# Patient Record
Sex: MALE | Race: WHITE | Employment: FULL TIME | URBAN - METROPOLITAN AREA
[De-identification: names, ages, dates, MRNs, and addresses within clinical notes are randomized per-mention and may not be internally consistent; named-entity substitution may affect disease eponyms.]

---

## 2018-04-09 PROBLEM — Z95.4 HISTORY OF AORTIC VALVE REPLACEMENT WITH METALLIC VALVE: Status: ACTIVE | Noted: 2018-04-09

## 2018-04-11 ENCOUNTER — TELEPHONE (OUTPATIENT)
Dept: PHARMACY | Age: 68
End: 2018-04-11

## 2018-04-11 RX ORDER — METOPROLOL SUCCINATE 50 MG/1
50 TABLET, EXTENDED RELEASE ORAL DAILY
COMMUNITY

## 2018-04-11 RX ORDER — DOCUSATE SODIUM 100 MG/1
100 CAPSULE, LIQUID FILLED ORAL 2 TIMES DAILY PRN
COMMUNITY
End: 2019-05-30

## 2018-04-11 RX ORDER — UBIDECARENONE 100 MG
200 CAPSULE ORAL DAILY
COMMUNITY

## 2018-04-11 RX ORDER — M-VIT,TX,IRON,MINS/CALC/FOLIC 27MG-0.4MG
2 TABLET ORAL DAILY
COMMUNITY

## 2018-04-11 RX ORDER — WARFARIN SODIUM 5 MG/1
5 TABLET ORAL
COMMUNITY
End: 2018-04-16 | Stop reason: SDUPTHER

## 2018-04-11 RX ORDER — FUROSEMIDE 20 MG/1
20 TABLET ORAL DAILY PRN
COMMUNITY
End: 2019-05-30

## 2018-04-11 RX ORDER — ACETAMINOPHEN 325 MG/1
325 TABLET ORAL 2 TIMES DAILY
COMMUNITY
End: 2018-06-28 | Stop reason: ALTCHOICE

## 2018-04-16 RX ORDER — WARFARIN SODIUM 5 MG/1
TABLET ORAL
Qty: 42 TABLET | Refills: 3 | Status: SHIPPED | OUTPATIENT
Start: 2018-04-16 | End: 2018-05-11 | Stop reason: ALTCHOICE

## 2018-05-11 ENCOUNTER — HOSPITAL ENCOUNTER (OUTPATIENT)
Dept: PHARMACY | Age: 68
Setting detail: THERAPIES SERIES
Discharge: HOME | End: 2018-05-11
Payer: COMMERCIAL

## 2018-05-11 VITALS
HEART RATE: 58 BPM | BODY MASS INDEX: 29.75 KG/M2 | WEIGHT: 238 LBS | SYSTOLIC BLOOD PRESSURE: 137 MMHG | DIASTOLIC BLOOD PRESSURE: 72 MMHG

## 2018-05-11 DIAGNOSIS — Z95.4 HISTORY OF AORTIC VALVE REPLACEMENT WITH METALLIC VALVE: ICD-10-CM

## 2018-05-11 LAB — INTERNATIONAL NORMALIZATION RATIO, POC: 3

## 2018-05-11 PROCEDURE — 99212 OFFICE O/P EST SF 10 MIN: CPT

## 2018-05-11 PROCEDURE — 85610 PROTHROMBIN TIME: CPT

## 2018-05-11 RX ORDER — WARFARIN SODIUM 10 MG/1
TABLET ORAL
Qty: 90 TABLET | Refills: 3 | Status: SHIPPED | OUTPATIENT
Start: 2018-05-11 | End: 2018-06-28 | Stop reason: SDUPTHER

## 2018-05-11 RX ORDER — CEPHALEXIN 500 MG/1
500 CAPSULE ORAL 2 TIMES DAILY
COMMUNITY
End: 2018-11-15

## 2018-05-21 ENCOUNTER — HOSPITAL ENCOUNTER (OUTPATIENT)
Dept: PHARMACY | Age: 68
Setting detail: THERAPIES SERIES
Discharge: HOME | End: 2018-05-21
Payer: COMMERCIAL

## 2018-05-21 VITALS
HEART RATE: 81 BPM | DIASTOLIC BLOOD PRESSURE: 76 MMHG | BODY MASS INDEX: 30.5 KG/M2 | WEIGHT: 244 LBS | SYSTOLIC BLOOD PRESSURE: 144 MMHG

## 2018-05-21 DIAGNOSIS — Z95.4 HISTORY OF AORTIC VALVE REPLACEMENT WITH METALLIC VALVE: ICD-10-CM

## 2018-05-21 LAB — INTERNATIONAL NORMALIZATION RATIO, POC: 4.3

## 2018-05-21 PROCEDURE — 99211 OFF/OP EST MAY X REQ PHY/QHP: CPT

## 2018-05-21 PROCEDURE — 85610 PROTHROMBIN TIME: CPT

## 2018-05-31 ENCOUNTER — HOSPITAL ENCOUNTER (OUTPATIENT)
Dept: PHARMACY | Age: 68
Setting detail: THERAPIES SERIES
Discharge: HOME | End: 2018-05-31
Payer: COMMERCIAL

## 2018-05-31 VITALS
DIASTOLIC BLOOD PRESSURE: 71 MMHG | BODY MASS INDEX: 29.67 KG/M2 | WEIGHT: 237.4 LBS | SYSTOLIC BLOOD PRESSURE: 123 MMHG | HEART RATE: 71 BPM

## 2018-05-31 DIAGNOSIS — Z95.4 HISTORY OF AORTIC VALVE REPLACEMENT WITH METALLIC VALVE: ICD-10-CM

## 2018-05-31 LAB — INTERNATIONAL NORMALIZATION RATIO, POC: 1.9

## 2018-05-31 PROCEDURE — 85610 PROTHROMBIN TIME: CPT

## 2018-06-14 ENCOUNTER — HOSPITAL ENCOUNTER (OUTPATIENT)
Dept: PHARMACY | Age: 68
Setting detail: THERAPIES SERIES
Discharge: HOME | End: 2018-06-14
Payer: COMMERCIAL

## 2018-06-14 VITALS
DIASTOLIC BLOOD PRESSURE: 83 MMHG | WEIGHT: 244.4 LBS | BODY MASS INDEX: 30.55 KG/M2 | HEART RATE: 75 BPM | SYSTOLIC BLOOD PRESSURE: 165 MMHG

## 2018-06-14 DIAGNOSIS — Z95.4 HISTORY OF AORTIC VALVE REPLACEMENT WITH METALLIC VALVE: ICD-10-CM

## 2018-06-14 LAB — INTERNATIONAL NORMALIZATION RATIO, POC: 2.2

## 2018-06-14 PROCEDURE — 85610 PROTHROMBIN TIME: CPT

## 2018-06-14 PROCEDURE — 99211 OFF/OP EST MAY X REQ PHY/QHP: CPT

## 2018-06-28 ENCOUNTER — HOSPITAL ENCOUNTER (OUTPATIENT)
Dept: PHARMACY | Age: 68
Setting detail: THERAPIES SERIES
Discharge: HOME | End: 2018-06-28
Payer: COMMERCIAL

## 2018-06-28 VITALS
DIASTOLIC BLOOD PRESSURE: 79 MMHG | HEART RATE: 64 BPM | SYSTOLIC BLOOD PRESSURE: 168 MMHG | BODY MASS INDEX: 30.77 KG/M2 | WEIGHT: 246.2 LBS

## 2018-06-28 DIAGNOSIS — Z95.4 HISTORY OF AORTIC VALVE REPLACEMENT WITH METALLIC VALVE: ICD-10-CM

## 2018-06-28 LAB — INTERNATIONAL NORMALIZATION RATIO, POC: 3.4

## 2018-06-28 PROCEDURE — 99211 OFF/OP EST MAY X REQ PHY/QHP: CPT

## 2018-06-28 PROCEDURE — 85610 PROTHROMBIN TIME: CPT

## 2018-06-28 RX ORDER — WARFARIN SODIUM 10 MG/1
TABLET ORAL
Qty: 100 TABLET | Refills: 3 | Status: SHIPPED | OUTPATIENT
Start: 2018-06-28 | End: 2019-08-12 | Stop reason: SDUPTHER

## 2018-07-12 ENCOUNTER — HOSPITAL ENCOUNTER (OUTPATIENT)
Dept: PHARMACY | Age: 68
Setting detail: THERAPIES SERIES
Discharge: HOME | End: 2018-07-12
Payer: COMMERCIAL

## 2018-07-12 VITALS
WEIGHT: 242.2 LBS | BODY MASS INDEX: 30.27 KG/M2 | SYSTOLIC BLOOD PRESSURE: 154 MMHG | HEART RATE: 60 BPM | DIASTOLIC BLOOD PRESSURE: 76 MMHG

## 2018-07-12 DIAGNOSIS — Z95.2 HISTORY OF MECHANICAL AORTIC VALVE REPLACEMENT: ICD-10-CM

## 2018-07-12 DIAGNOSIS — Z95.4 HISTORY OF AORTIC VALVE REPLACEMENT WITH METALLIC VALVE: ICD-10-CM

## 2018-07-12 LAB — INTERNATIONAL NORMALIZATION RATIO, POC: 2.2

## 2018-07-12 PROCEDURE — 85610 PROTHROMBIN TIME: CPT

## 2018-07-12 PROCEDURE — 99211 OFF/OP EST MAY X REQ PHY/QHP: CPT

## 2018-07-12 RX ORDER — ACETAMINOPHEN 500 MG
500 TABLET ORAL DAILY PRN
COMMUNITY
End: 2019-05-30

## 2018-07-12 RX ORDER — SENNOSIDES 8.6 MG
650 CAPSULE ORAL DAILY PRN
COMMUNITY

## 2018-07-26 ENCOUNTER — HOSPITAL ENCOUNTER (OUTPATIENT)
Dept: PHARMACY | Age: 68
Setting detail: THERAPIES SERIES
Discharge: HOME | End: 2018-07-26
Payer: COMMERCIAL

## 2018-07-26 VITALS
WEIGHT: 244.6 LBS | BODY MASS INDEX: 30.57 KG/M2 | HEART RATE: 64 BPM | SYSTOLIC BLOOD PRESSURE: 147 MMHG | DIASTOLIC BLOOD PRESSURE: 75 MMHG

## 2018-07-26 DIAGNOSIS — Z95.2 HISTORY OF MECHANICAL AORTIC VALVE REPLACEMENT: ICD-10-CM

## 2018-07-26 LAB — INTERNATIONAL NORMALIZATION RATIO, POC: 5.1

## 2018-07-26 PROCEDURE — 85610 PROTHROMBIN TIME: CPT

## 2018-07-26 PROCEDURE — 99211 OFF/OP EST MAY X REQ PHY/QHP: CPT

## 2018-08-02 ENCOUNTER — HOSPITAL ENCOUNTER (OUTPATIENT)
Dept: PHARMACY | Age: 68
Setting detail: THERAPIES SERIES
Discharge: HOME | End: 2018-08-02
Payer: COMMERCIAL

## 2018-08-02 VITALS
SYSTOLIC BLOOD PRESSURE: 175 MMHG | DIASTOLIC BLOOD PRESSURE: 93 MMHG | BODY MASS INDEX: 30.5 KG/M2 | HEART RATE: 78 BPM | WEIGHT: 244 LBS

## 2018-08-02 DIAGNOSIS — Z95.2 HISTORY OF MECHANICAL AORTIC VALVE REPLACEMENT: ICD-10-CM

## 2018-08-02 LAB — INTERNATIONAL NORMALIZATION RATIO, POC: 2.7

## 2018-08-02 PROCEDURE — 85610 PROTHROMBIN TIME: CPT

## 2018-08-02 PROCEDURE — 99211 OFF/OP EST MAY X REQ PHY/QHP: CPT

## 2018-08-02 NOTE — PROGRESS NOTES
[id number] [facility]    Patient Findings     Negatives:   Signs/symptoms of thrombosis, Signs/symptoms of bleeding, Laboratory test error suspected, Change in health, Change in alcohol use, Change in activity, Upcoming invasive procedure, Emergency department visit, Upcoming dental procedure, Missed doses, Extra doses, Change in medications, Change in diet/appetite, Hospital admission, Bruising, Other complaints         Patient  reports that he has never smoked. He has never used smokeless tobacco.     Assessment/Plan:  INR is therapeutic at 2.7, goal is 2.5-3.5  Warfarin Dose: continue same, 10 mg daily except 15 mg on Thursdays  Follow Up: 2 weeks    Patient understands dosing directions and information discussed. Dosing schedule and follow up appointment given to patient. Patient acknowledges working in consult agreement with pharmacist as referred by his/her physician.     Geri Horn PharmD 8/2/2018 4:01 PM

## 2018-08-16 ENCOUNTER — HOSPITAL ENCOUNTER (OUTPATIENT)
Dept: PHARMACY | Age: 68
Setting detail: THERAPIES SERIES
Discharge: HOME | End: 2018-08-16
Payer: COMMERCIAL

## 2018-08-16 VITALS
DIASTOLIC BLOOD PRESSURE: 80 MMHG | BODY MASS INDEX: 30.6 KG/M2 | SYSTOLIC BLOOD PRESSURE: 156 MMHG | HEART RATE: 56 BPM | WEIGHT: 244.8 LBS

## 2018-08-16 DIAGNOSIS — Z95.2 HISTORY OF MECHANICAL AORTIC VALVE REPLACEMENT: ICD-10-CM

## 2018-08-16 LAB — INTERNATIONAL NORMALIZATION RATIO, POC: 2.9

## 2018-08-16 PROCEDURE — 85610 PROTHROMBIN TIME: CPT

## 2018-08-16 PROCEDURE — 99211 OFF/OP EST MAY X REQ PHY/QHP: CPT

## 2018-09-06 ENCOUNTER — HOSPITAL ENCOUNTER (OUTPATIENT)
Dept: PHARMACY | Age: 68
Setting detail: THERAPIES SERIES
Discharge: HOME | End: 2018-09-06
Payer: COMMERCIAL

## 2018-09-06 VITALS
BODY MASS INDEX: 30.95 KG/M2 | DIASTOLIC BLOOD PRESSURE: 78 MMHG | SYSTOLIC BLOOD PRESSURE: 158 MMHG | WEIGHT: 247.6 LBS | HEART RATE: 65 BPM

## 2018-09-06 DIAGNOSIS — Z95.2 HISTORY OF MECHANICAL AORTIC VALVE REPLACEMENT: ICD-10-CM

## 2018-09-06 LAB — INTERNATIONAL NORMALIZATION RATIO, POC: 2.7

## 2018-09-06 PROCEDURE — 99211 OFF/OP EST MAY X REQ PHY/QHP: CPT

## 2018-09-06 PROCEDURE — 85610 PROTHROMBIN TIME: CPT

## 2018-10-04 ENCOUNTER — HOSPITAL ENCOUNTER (OUTPATIENT)
Dept: PHARMACY | Age: 68
Setting detail: THERAPIES SERIES
Discharge: HOME | End: 2018-10-04
Payer: COMMERCIAL

## 2018-10-04 VITALS
BODY MASS INDEX: 31.3 KG/M2 | HEART RATE: 70 BPM | DIASTOLIC BLOOD PRESSURE: 88 MMHG | SYSTOLIC BLOOD PRESSURE: 159 MMHG | WEIGHT: 250.4 LBS

## 2018-10-04 DIAGNOSIS — Z95.2 HISTORY OF MECHANICAL AORTIC VALVE REPLACEMENT: ICD-10-CM

## 2018-10-04 LAB — INTERNATIONAL NORMALIZATION RATIO, POC: 3.9

## 2018-10-04 PROCEDURE — 99211 OFF/OP EST MAY X REQ PHY/QHP: CPT

## 2018-10-04 PROCEDURE — 85610 PROTHROMBIN TIME: CPT

## 2018-10-25 ENCOUNTER — HOSPITAL ENCOUNTER (OUTPATIENT)
Dept: PHARMACY | Age: 68
Setting detail: THERAPIES SERIES
Discharge: HOME | End: 2018-10-25
Payer: COMMERCIAL

## 2018-10-25 VITALS
SYSTOLIC BLOOD PRESSURE: 145 MMHG | HEART RATE: 77 BPM | DIASTOLIC BLOOD PRESSURE: 81 MMHG | BODY MASS INDEX: 31.6 KG/M2 | WEIGHT: 252.8 LBS

## 2018-10-25 DIAGNOSIS — Z95.2 HISTORY OF MECHANICAL AORTIC VALVE REPLACEMENT: ICD-10-CM

## 2018-10-25 LAB — INTERNATIONAL NORMALIZATION RATIO, POC: 4.7

## 2018-10-25 PROCEDURE — 85610 PROTHROMBIN TIME: CPT

## 2018-10-25 PROCEDURE — 99211 OFF/OP EST MAY X REQ PHY/QHP: CPT

## 2018-11-15 ENCOUNTER — HOSPITAL ENCOUNTER (OUTPATIENT)
Dept: PHARMACY | Age: 68
Setting detail: THERAPIES SERIES
Discharge: HOME | End: 2018-11-15
Payer: COMMERCIAL

## 2018-11-15 VITALS
WEIGHT: 253.6 LBS | DIASTOLIC BLOOD PRESSURE: 90 MMHG | SYSTOLIC BLOOD PRESSURE: 172 MMHG | BODY MASS INDEX: 31.7 KG/M2 | HEART RATE: 71 BPM

## 2018-11-15 DIAGNOSIS — Z95.2 HISTORY OF MECHANICAL AORTIC VALVE REPLACEMENT: ICD-10-CM

## 2018-11-15 LAB — INTERNATIONAL NORMALIZATION RATIO, POC: 3.4

## 2018-11-15 PROCEDURE — 99211 OFF/OP EST MAY X REQ PHY/QHP: CPT

## 2018-11-15 PROCEDURE — 85610 PROTHROMBIN TIME: CPT

## 2018-11-15 RX ORDER — LOSARTAN POTASSIUM 50 MG/1
75 TABLET ORAL DAILY
COMMUNITY

## 2018-12-13 ENCOUNTER — HOSPITAL ENCOUNTER (OUTPATIENT)
Dept: PHARMACY | Age: 68
Setting detail: THERAPIES SERIES
Discharge: HOME | End: 2018-12-13
Payer: COMMERCIAL

## 2018-12-13 VITALS
SYSTOLIC BLOOD PRESSURE: 159 MMHG | HEART RATE: 76 BPM | BODY MASS INDEX: 32.52 KG/M2 | WEIGHT: 260.2 LBS | DIASTOLIC BLOOD PRESSURE: 78 MMHG

## 2018-12-13 DIAGNOSIS — Z95.2 HISTORY OF MECHANICAL AORTIC VALVE REPLACEMENT: ICD-10-CM

## 2018-12-13 LAB — INTERNATIONAL NORMALIZATION RATIO, POC: 3.6

## 2018-12-13 PROCEDURE — 99211 OFF/OP EST MAY X REQ PHY/QHP: CPT

## 2018-12-13 PROCEDURE — 85610 PROTHROMBIN TIME: CPT

## 2019-01-10 ENCOUNTER — HOSPITAL ENCOUNTER (OUTPATIENT)
Dept: PHARMACY | Age: 69
Setting detail: THERAPIES SERIES
Discharge: HOME | End: 2019-01-10
Payer: COMMERCIAL

## 2019-01-10 VITALS
WEIGHT: 256.4 LBS | BODY MASS INDEX: 32.05 KG/M2 | SYSTOLIC BLOOD PRESSURE: 161 MMHG | DIASTOLIC BLOOD PRESSURE: 84 MMHG | HEART RATE: 71 BPM

## 2019-01-10 DIAGNOSIS — Z95.2 HISTORY OF MECHANICAL AORTIC VALVE REPLACEMENT: ICD-10-CM

## 2019-01-10 LAB — INTERNATIONAL NORMALIZATION RATIO, POC: 2.3

## 2019-01-10 PROCEDURE — 85610 PROTHROMBIN TIME: CPT

## 2019-01-10 PROCEDURE — 99211 OFF/OP EST MAY X REQ PHY/QHP: CPT

## 2019-02-07 ENCOUNTER — HOSPITAL ENCOUNTER (OUTPATIENT)
Dept: PHARMACY | Age: 69
Setting detail: THERAPIES SERIES
Discharge: HOME | End: 2019-02-07
Payer: COMMERCIAL

## 2019-02-07 VITALS
WEIGHT: 251 LBS | DIASTOLIC BLOOD PRESSURE: 79 MMHG | BODY MASS INDEX: 31.37 KG/M2 | SYSTOLIC BLOOD PRESSURE: 135 MMHG | HEART RATE: 72 BPM

## 2019-02-07 DIAGNOSIS — Z95.2 HISTORY OF MECHANICAL AORTIC VALVE REPLACEMENT: ICD-10-CM

## 2019-02-07 LAB — INTERNATIONAL NORMALIZATION RATIO, POC: 2.6

## 2019-02-07 PROCEDURE — 99211 OFF/OP EST MAY X REQ PHY/QHP: CPT

## 2019-02-07 PROCEDURE — 85610 PROTHROMBIN TIME: CPT

## 2019-03-07 ENCOUNTER — HOSPITAL ENCOUNTER (OUTPATIENT)
Dept: PHARMACY | Age: 69
Setting detail: THERAPIES SERIES
Discharge: HOME | End: 2019-03-07
Payer: COMMERCIAL

## 2019-03-07 VITALS
BODY MASS INDEX: 31.85 KG/M2 | WEIGHT: 254.8 LBS | DIASTOLIC BLOOD PRESSURE: 75 MMHG | HEART RATE: 65 BPM | SYSTOLIC BLOOD PRESSURE: 129 MMHG

## 2019-03-07 DIAGNOSIS — Z95.2 HISTORY OF MECHANICAL AORTIC VALVE REPLACEMENT: ICD-10-CM

## 2019-03-07 LAB — INR BLD: 1.7

## 2019-03-07 PROCEDURE — 99211 OFF/OP EST MAY X REQ PHY/QHP: CPT

## 2019-03-07 PROCEDURE — 85610 PROTHROMBIN TIME: CPT

## 2019-03-07 PROCEDURE — G0463 HOSPITAL OUTPT CLINIC VISIT: HCPCS

## 2019-03-21 ENCOUNTER — HOSPITAL ENCOUNTER (OUTPATIENT)
Dept: PHARMACY | Age: 69
Setting detail: THERAPIES SERIES
Discharge: HOME | End: 2019-03-21
Payer: COMMERCIAL

## 2019-03-21 VITALS
SYSTOLIC BLOOD PRESSURE: 159 MMHG | BODY MASS INDEX: 32.05 KG/M2 | WEIGHT: 256.4 LBS | DIASTOLIC BLOOD PRESSURE: 84 MMHG | HEART RATE: 64 BPM

## 2019-03-21 DIAGNOSIS — Z95.2 HISTORY OF MECHANICAL AORTIC VALVE REPLACEMENT: ICD-10-CM

## 2019-03-21 LAB
INR BLD: 7
INR BLD: 7.1

## 2019-03-21 PROCEDURE — G0463 HOSPITAL OUTPT CLINIC VISIT: HCPCS

## 2019-03-21 PROCEDURE — 85610 PROTHROMBIN TIME: CPT

## 2019-03-21 PROCEDURE — 99211 OFF/OP EST MAY X REQ PHY/QHP: CPT

## 2019-03-28 ENCOUNTER — HOSPITAL ENCOUNTER (OUTPATIENT)
Dept: PHARMACY | Age: 69
Setting detail: THERAPIES SERIES
Discharge: HOME | End: 2019-03-28
Payer: COMMERCIAL

## 2019-03-28 VITALS
HEART RATE: 71 BPM | WEIGHT: 255.2 LBS | SYSTOLIC BLOOD PRESSURE: 132 MMHG | BODY MASS INDEX: 31.9 KG/M2 | DIASTOLIC BLOOD PRESSURE: 81 MMHG

## 2019-03-28 DIAGNOSIS — Z95.2 HISTORY OF MECHANICAL AORTIC VALVE REPLACEMENT: ICD-10-CM

## 2019-03-28 LAB — INR BLD: 1.8

## 2019-03-28 PROCEDURE — 85610 PROTHROMBIN TIME: CPT

## 2019-04-11 ENCOUNTER — HOSPITAL ENCOUNTER (OUTPATIENT)
Dept: PHARMACY | Age: 69
Setting detail: THERAPIES SERIES
Discharge: HOME | End: 2019-04-11
Payer: COMMERCIAL

## 2019-04-11 VITALS
SYSTOLIC BLOOD PRESSURE: 150 MMHG | DIASTOLIC BLOOD PRESSURE: 78 MMHG | WEIGHT: 257.4 LBS | BODY MASS INDEX: 32.17 KG/M2 | HEART RATE: 67 BPM

## 2019-04-11 DIAGNOSIS — Z95.2 HISTORY OF MECHANICAL AORTIC VALVE REPLACEMENT: ICD-10-CM

## 2019-04-11 LAB — INTERNATIONAL NORMALIZATION RATIO, POC: 4.3

## 2019-04-11 PROCEDURE — 85610 PROTHROMBIN TIME: CPT

## 2019-04-11 PROCEDURE — 99211 OFF/OP EST MAY X REQ PHY/QHP: CPT

## 2019-04-11 NOTE — PROGRESS NOTES
[address] [facility]    Patient Findings     Negatives:   Signs/symptoms of thrombosis, Signs/symptoms of bleeding, Laboratory test error suspected, Change in health, Change in alcohol use, Change in activity, Upcoming invasive procedure, Emergency department visit, Upcoming dental procedure, Missed doses, Extra doses, Change in medications, Change in diet/appetite, Hospital admission, Bruising, Other complaints         Patient  reports that he has never smoked. He has never used smokeless tobacco.     Assessment/Plan:  Warfarin indication: mechanical aortic valve replacement      INR today is SUPRAtherapeutic at 4.3, goal is 2.5-3.5. NO KNOWN CAUSE. PATIENT HAS BEEN FLUCTUATING BETWEEN SUB & SUPRATHERAPEUTIC SO WILL NOT CHANGE DOSE AT THIS TIME FELIPE W/ VALVE REPLACEMENT    Warfarin Dose: 5MG TODAY; THEN CONTINUE SAME (10MG DAILY)    Follow Up: 2 weeks    Patient understands dosing directions and information discussed. Dosing schedule and follow up appointment given to patient. Patient acknowledges working in consult agreement with pharmacist as referred by his/her physician.     Fadumo Anaya PharmD 4/11/2019 4:11 PM

## 2019-04-25 ENCOUNTER — HOSPITAL ENCOUNTER (OUTPATIENT)
Dept: PHARMACY | Age: 69
Setting detail: THERAPIES SERIES
Discharge: HOME | End: 2019-04-25
Payer: COMMERCIAL

## 2019-04-25 VITALS
WEIGHT: 255.6 LBS | DIASTOLIC BLOOD PRESSURE: 83 MMHG | HEART RATE: 65 BPM | BODY MASS INDEX: 31.95 KG/M2 | SYSTOLIC BLOOD PRESSURE: 159 MMHG

## 2019-04-25 DIAGNOSIS — Z95.2 HISTORY OF MECHANICAL AORTIC VALVE REPLACEMENT: ICD-10-CM

## 2019-04-25 LAB — INTERNATIONAL NORMALIZATION RATIO, POC: 5.8

## 2019-04-25 PROCEDURE — 85610 PROTHROMBIN TIME: CPT

## 2019-04-25 PROCEDURE — 99211 OFF/OP EST MAY X REQ PHY/QHP: CPT

## 2019-04-25 NOTE — PROGRESS NOTES
[address] [facility]    Patient Findings     Positives:   Upcoming dental procedure (2 DENTAL IMPLANT CAPS TO BE PLACED ON MAY 11TH. )    Negatives:   Signs/symptoms of thrombosis, Signs/symptoms of bleeding, Laboratory test error suspected, Change in health, Change in alcohol use, Change in activity, Upcoming invasive procedure, Emergency department visit, Missed doses, Extra doses, Change in medications, Change in diet/appetite ([employer] [address]), Hospital admission, Bruising, Other complaints         Patient  reports that he has never smoked. He has never used smokeless tobacco.     Assessment/Plan:  Warfarin indication: mechanical AVR      INR today is SUPRAtherapeutic at 5.8, goal is 2.5-3.5. NO KNOWN CAUSE. Warfarin Dose: HOLD TODAY THEN DECREASE WEEKLY DOSE BY 14% TO 5 MG EVERY MON./FRI.; 10 MG ALL OTHER DAYS    Follow Up: 1 week    Patient understands dosing directions and information discussed. Dosing schedule and follow up appointment given to patient. Patient acknowledges working in consult agreement with pharmacist as referred by his/her physician.     Umm Dominguez PharmD 4/26/2019 8:25 AM

## 2019-04-29 ENCOUNTER — TELEPHONE (OUTPATIENT)
Dept: PHARMACY | Age: 69
End: 2019-04-29

## 2019-04-29 DIAGNOSIS — Z95.2 HISTORY OF MECHANICAL AORTIC VALVE REPLACEMENT: ICD-10-CM

## 2019-04-29 NOTE — TELEPHONE ENCOUNTER
Change in INR range documented in patient's EMR. Will discuss with patient at next visit.    Keith Mcdermott PharmD 4/29/2019 4:02 PM

## 2019-05-09 ENCOUNTER — HOSPITAL ENCOUNTER (OUTPATIENT)
Dept: PHARMACY | Age: 69
Setting detail: THERAPIES SERIES
Discharge: HOME | End: 2019-05-09
Payer: COMMERCIAL

## 2019-05-09 VITALS
BODY MASS INDEX: 31.3 KG/M2 | DIASTOLIC BLOOD PRESSURE: 76 MMHG | WEIGHT: 250.4 LBS | SYSTOLIC BLOOD PRESSURE: 128 MMHG | HEART RATE: 74 BPM

## 2019-05-09 DIAGNOSIS — Z95.2 HISTORY OF MECHANICAL AORTIC VALVE REPLACEMENT: ICD-10-CM

## 2019-05-09 LAB — INTERNATIONAL NORMALIZATION RATIO, POC: 2.8

## 2019-05-09 PROCEDURE — 99211 OFF/OP EST MAY X REQ PHY/QHP: CPT

## 2019-05-09 PROCEDURE — 85610 PROTHROMBIN TIME: CPT

## 2019-05-09 NOTE — PROGRESS NOTES
[address] [facility]    Patient Findings     Negatives:   Signs/symptoms of thrombosis, Signs/symptoms of bleeding, Laboratory test error suspected, Change in health, Change in alcohol use, Change in activity, Upcoming invasive procedure, Emergency department visit, Upcoming dental procedure, Missed doses, Extra doses, Change in medications, Change in diet/appetite ([employer] [address] [facility]), Hospital admission, Bruising, Other complaints    Comments:   PCP NEXT WEEK          Patient  reports that he has never smoked. He has never used smokeless tobacco.     Assessment/Plan:  Warfarin indication: mechanical AVR      INR today is therapeutic at 2.8, goal is 2-3. Warfarin Dose: same (5 mg every Mon./Fri.; 10 mg all other days)    Follow Up: 3 weeks    Patient understands dosing directions and information discussed. Dosing schedule and follow up appointment given to patient. Patient acknowledges working in consult agreement with pharmacist as referred by his/her physician.     Ale Jones PharmD 5/9/2019 3:54 PM

## 2019-05-30 ENCOUNTER — HOSPITAL ENCOUNTER (OUTPATIENT)
Dept: PHARMACY | Age: 69
Setting detail: THERAPIES SERIES
Discharge: HOME | End: 2019-05-30
Payer: COMMERCIAL

## 2019-05-30 VITALS
DIASTOLIC BLOOD PRESSURE: 82 MMHG | WEIGHT: 254.6 LBS | HEART RATE: 67 BPM | SYSTOLIC BLOOD PRESSURE: 151 MMHG | BODY MASS INDEX: 31.82 KG/M2

## 2019-05-30 DIAGNOSIS — Z95.2 HISTORY OF MECHANICAL AORTIC VALVE REPLACEMENT: ICD-10-CM

## 2019-05-30 LAB — INTERNATIONAL NORMALIZATION RATIO, POC: 3.4

## 2019-05-30 PROCEDURE — 85610 PROTHROMBIN TIME: CPT

## 2019-05-30 PROCEDURE — 99211 OFF/OP EST MAY X REQ PHY/QHP: CPT

## 2019-05-30 NOTE — PROGRESS NOTES
[address] [facility]    Patient Findings     Positives:   Change in medications (MED REC COMPLETED)    Negatives:   Signs/symptoms of thrombosis, Signs/symptoms of bleeding, Laboratory test error suspected, Change in health, Change in alcohol use, Change in activity, Upcoming invasive procedure, Emergency department visit, Upcoming dental procedure, Missed doses, Extra doses, Change in diet/appetite ([employer] TWICE WEEK; [facility]), Hospital admission, Bruising, Other complaints    Comments:   DR. Paresh Carpenter APPT. IN A FEW WEEKS         Patient  reports that he has never smoked. He has never used smokeless tobacco.     Assessment/Plan:  Warfarin indication: mechanical AVR      INR today is SUPRAtherapeutic at 3.4, goal is 2-3. NO KNOWN CAUSE    Warfarin Dose: DECREASE WEEKLY DOSE BY 8% TO 5 MG EVERY M/W/F; 10 MG ALL OTHER DAYS    Follow Up: 2 weeks - PATIENT REFUSED, COMING IN 3 WEEKS    Patient understands dosing directions and information discussed. Dosing schedule and follow up appointment given to patient. Patient acknowledges working in consult agreement with pharmacist as referred by his/her physician.     Kali Briceno PharmD 5/30/2019 4:05 PM

## 2019-06-20 ENCOUNTER — HOSPITAL ENCOUNTER (OUTPATIENT)
Dept: PHARMACY | Age: 69
Setting detail: THERAPIES SERIES
Discharge: HOME | End: 2019-06-20
Payer: COMMERCIAL

## 2019-06-20 VITALS
HEART RATE: 67 BPM | BODY MASS INDEX: 31.75 KG/M2 | WEIGHT: 254 LBS | SYSTOLIC BLOOD PRESSURE: 155 MMHG | DIASTOLIC BLOOD PRESSURE: 77 MMHG

## 2019-06-20 DIAGNOSIS — Z95.2 HISTORY OF MECHANICAL AORTIC VALVE REPLACEMENT: ICD-10-CM

## 2019-06-20 LAB — INTERNATIONAL NORMALIZATION RATIO, POC: 4.1

## 2019-06-20 PROCEDURE — 85610 PROTHROMBIN TIME: CPT

## 2019-06-20 PROCEDURE — 99211 OFF/OP EST MAY X REQ PHY/QHP: CPT

## 2019-06-24 ENCOUNTER — OFFICE VISIT (OUTPATIENT)
Dept: RHEUMATOLOGY | Age: 69
End: 2019-06-24
Payer: COMMERCIAL

## 2019-06-24 ENCOUNTER — HOSPITAL ENCOUNTER (OUTPATIENT)
Age: 69
Discharge: HOME | End: 2019-06-26
Payer: COMMERCIAL

## 2019-06-24 VITALS
HEIGHT: 74 IN | BODY MASS INDEX: 31.08 KG/M2 | OXYGEN SATURATION: 97 % | DIASTOLIC BLOOD PRESSURE: 64 MMHG | WEIGHT: 242.2 LBS | HEART RATE: 53 BPM | SYSTOLIC BLOOD PRESSURE: 132 MMHG | TEMPERATURE: 98.2 F

## 2019-06-24 DIAGNOSIS — M17.10 PRIMARY LOCALIZED OSTEOARTHROSIS OF LOWER LEG, UNSPECIFIED LATERALITY: ICD-10-CM

## 2019-06-24 LAB
ALBUMIN SERPL-MCNC: 4.4 G/DL (ref 3.5–5.2)
ALP BLD-CCNC: 68 U/L (ref 40–129)
ALT SERPL-CCNC: 33 U/L (ref 0–40)
ANION GAP SERPL CALCULATED.3IONS-SCNC: 15 MMOL/L (ref 7–16)
AST SERPL-CCNC: 37 U/L (ref 0–39)
BASOPHILS ABSOLUTE: 0.04 E9/L (ref 0–0.2)
BASOPHILS RELATIVE PERCENT: 0.6 % (ref 0–2)
BILIRUB SERPL-MCNC: 0.5 MG/DL (ref 0–1.2)
BILIRUBIN URINE: NEGATIVE
BLOOD, URINE: NEGATIVE
BUN BLDV-MCNC: 29 MG/DL (ref 8–23)
C-REACTIVE PROTEIN: 0.4 MG/DL (ref 0–0.4)
CALCIUM SERPL-MCNC: 8.8 MG/DL (ref 8.6–10.2)
CHLORIDE BLD-SCNC: 102 MMOL/L (ref 98–107)
CLARITY: CLEAR
CO2: 21 MMOL/L (ref 22–29)
COLOR: YELLOW
CREAT SERPL-MCNC: 1.4 MG/DL (ref 0.7–1.2)
CREATININE URINE: 246 MG/DL (ref 40–278)
EOSINOPHILS ABSOLUTE: 0.16 E9/L (ref 0.05–0.5)
EOSINOPHILS RELATIVE PERCENT: 2.3 % (ref 0–6)
GFR AFRICAN AMERICAN: >60
GFR NON-AFRICAN AMERICAN: 50 ML/MIN/1.73
GLUCOSE BLD-MCNC: 102 MG/DL (ref 74–99)
GLUCOSE URINE: NEGATIVE MG/DL
HCT VFR BLD CALC: 43.1 % (ref 37–54)
HEMOGLOBIN: 14.1 G/DL (ref 12.5–16.5)
IMMATURE GRANULOCYTES #: 0.03 E9/L
IMMATURE GRANULOCYTES %: 0.4 % (ref 0–5)
KETONES, URINE: ABNORMAL MG/DL
LEUKOCYTE ESTERASE, URINE: NEGATIVE
LYMPHOCYTES ABSOLUTE: 1.27 E9/L (ref 1.5–4)
LYMPHOCYTES RELATIVE PERCENT: 17.9 % (ref 20–42)
MCH RBC QN AUTO: 29.5 PG (ref 26–35)
MCHC RBC AUTO-ENTMCNC: 32.7 % (ref 32–34.5)
MCV RBC AUTO: 90.2 FL (ref 80–99.9)
MONOCYTES ABSOLUTE: 0.65 E9/L (ref 0.1–0.95)
MONOCYTES RELATIVE PERCENT: 9.1 % (ref 2–12)
NEUTROPHILS ABSOLUTE: 4.96 E9/L (ref 1.8–7.3)
NEUTROPHILS RELATIVE PERCENT: 69.7 % (ref 43–80)
NITRITE, URINE: NEGATIVE
PDW BLD-RTO: 13.4 FL (ref 11.5–15)
PH UA: 5.5 (ref 5–9)
PLATELET # BLD: 164 E9/L (ref 130–450)
PMV BLD AUTO: 10.5 FL (ref 7–12)
POTASSIUM SERPL-SCNC: 4.4 MMOL/L (ref 3.5–5)
PROTEIN PROTEIN: 13 MG/DL (ref 0–12)
PROTEIN UA: NEGATIVE MG/DL
PROTEIN/CREAT RATIO: 0.1
PROTEIN/CREAT RATIO: 0.1 (ref 0–0.2)
RBC # BLD: 4.78 E12/L (ref 3.8–5.8)
RHEUMATOID FACTOR: 11 IU/ML (ref 0–13)
SEDIMENTATION RATE, ERYTHROCYTE: 10 MM/HR (ref 0–15)
SODIUM BLD-SCNC: 138 MMOL/L (ref 132–146)
SPECIFIC GRAVITY UA: 1.02 (ref 1–1.03)
TOTAL PROTEIN: 7.2 G/DL (ref 6.4–8.3)
URIC ACID, SERUM: 7 MG/DL (ref 3.4–7)
UROBILINOGEN, URINE: 0.2 E.U./DL
WBC # BLD: 7.1 E9/L (ref 4.5–11.5)

## 2019-06-24 PROCEDURE — 86039 ANTINUCLEAR ANTIBODIES (ANA): CPT

## 2019-06-24 PROCEDURE — 85651 RBC SED RATE NONAUTOMATED: CPT

## 2019-06-24 PROCEDURE — 86140 C-REACTIVE PROTEIN: CPT

## 2019-06-24 PROCEDURE — 80053 COMPREHEN METABOLIC PANEL: CPT

## 2019-06-24 PROCEDURE — 84156 ASSAY OF PROTEIN URINE: CPT

## 2019-06-24 PROCEDURE — 84550 ASSAY OF BLOOD/URIC ACID: CPT

## 2019-06-24 PROCEDURE — 86200 CCP ANTIBODY: CPT

## 2019-06-24 PROCEDURE — 86431 RHEUMATOID FACTOR QUANT: CPT

## 2019-06-24 PROCEDURE — 36415 COLL VENOUS BLD VENIPUNCTURE: CPT

## 2019-06-24 PROCEDURE — 85025 COMPLETE CBC W/AUTO DIFF WBC: CPT

## 2019-06-24 PROCEDURE — 86255 FLUORESCENT ANTIBODY SCREEN: CPT

## 2019-06-24 PROCEDURE — 82570 ASSAY OF URINE CREATININE: CPT

## 2019-06-24 PROCEDURE — 81003 URINALYSIS AUTO W/O SCOPE: CPT

## 2019-06-24 PROCEDURE — 86225 DNA ANTIBODY NATIVE: CPT

## 2019-06-24 PROCEDURE — 99203 OFFICE O/P NEW LOW 30 MIN: CPT | Performed by: INTERNAL MEDICINE

## 2019-06-24 PROCEDURE — 86235 NUCLEAR ANTIGEN ANTIBODY: CPT

## 2019-06-24 PROCEDURE — 86038 ANTINUCLEAR ANTIBODIES: CPT

## 2019-06-24 RX ORDER — METFORMIN HYDROCHLORIDE 500 MG/1
TABLET, EXTENDED RELEASE ORAL
Refills: 3 | COMMUNITY
Start: 2019-05-15 | End: 2019-06-24

## 2019-06-24 ASSESSMENT — ENCOUNTER SYMPTOMS
WHEEZING: 0
COLOR CHANGE: 0
ABDOMINAL PAIN: 0
ABDOMINAL DISTENTION: 0
PHOTOPHOBIA: 0
SINUS PRESSURE: 0
VOMITING: 0
SORE THROAT: 0
TROUBLE SWALLOWING: 0
CHEST TIGHTNESS: 0
COUGH: 0
BLOOD IN STOOL: 0
CONSTIPATION: 0
EYE REDNESS: 0
EYE DISCHARGE: 0
EYE ITCHING: 0
SINUS PAIN: 0
DIARRHEA: 0
EYE PAIN: 0
SHORTNESS OF BREATH: 0

## 2019-06-24 NOTE — PROGRESS NOTES
19    Name: Rick Sinclair  : 1950  Age: 70 y.o. Sex: male    Patient is seen at the request of Rae Pak MD    Patient presents for a rheumatology consultation regarding     Chief Complaint   Patient presents with    Joint Pain     bilat knees, ankles, thumbs    Joint Swelling       Patient would like to know about etiology of knee pain. Reports progressive increase in knee pain. Pain increases with activities. Now able to walk 2-3 blocks before worsening of pain. Reports decreased flexibility in knee joint. Unable to use nonsteroidal anti-inflammatory agents due to ongoing use of warfarin. Takes 2 tablets of Tylenol arthritis which is mildly helpful. Was seen by Dr. Dalia Chaves couple of years ago. At that point he received series  of corticosteroid injection which was helpful for short period of time. Dr  suggested knee surgery however patient decided to hold. In addition reports pain at the base of right thumb. Vitals:    19 1511   BP: 132/64   Site: Left Upper Arm   Position: Sitting   Pulse: 53   Temp: 98.2 °F (36.8 °C)   TempSrc: Temporal   SpO2: 97%   Weight: 242 lb 3.2 oz (109.9 kg)   Height: 6' 2\" (1.88 m)        Review of Systems   Constitutional: Negative for chills, fatigue, fever and unexpected weight change. HENT: Negative for congestion, ear discharge, ear pain, hearing loss, mouth sores, nosebleeds, sinus pressure, sinus pain, sore throat and trouble swallowing. Eyes: Negative for photophobia, pain, discharge, redness and itching. Respiratory: Negative for cough, chest tightness, shortness of breath and wheezing. Cardiovascular: Negative for chest pain, palpitations and leg swelling. Gastrointestinal: Negative for abdominal distention, abdominal pain, blood in stool, constipation, diarrhea and vomiting. Endocrine: Negative for cold intolerance, heat intolerance, polydipsia, polyphagia and polyuria.    Genitourinary: Negative for dysuria, flank pain, genital sores and hematuria. Musculoskeletal: Positive for arthralgias. Negative for myalgias and neck pain. Knee pain   Thumb pain   Skin: Negative for color change, pallor, rash and wound. Allergic/Immunologic: Negative for environmental allergies and food allergies. Neurological: Negative for dizziness, seizures, syncope, weakness, light-headedness, numbness and headaches. Hematological: Negative for adenopathy. Does not bruise/bleed easily. Psychiatric/Behavioral: Negative for confusion. The patient is not nervous/anxious.            Current Outpatient Medications:     losartan (COZAAR) 50 MG tablet, Take 50 mg by mouth daily , Disp: , Rfl:     Cholecalciferol (VITAMIN D PO), Take by mouth daily, Disp: , Rfl:     acetaminophen (TYLENOL) 650 MG extended release tablet, Take 650 mg by mouth daily as needed for Pain, Disp: , Rfl:     warfarin (COUMADIN) 10 MG tablet, Take 1 or 1 and a half tablets by mouth daily as directed by Anticoagulation Clinic, Disp: 100 tablet, Rfl: 3    aspirin 81 MG tablet, Take 81 mg by mouth daily, Disp: , Rfl:     metoprolol succinate (TOPROL XL) 50 MG extended release tablet, Take 50 mg by mouth daily , Disp: , Rfl:     Probiotic Product (ACIDOPHILUS HIGH-POTENCY PO), Take 2 tablets by mouth 2 times daily , Disp: , Rfl:     coenzyme Q10 100 MG CAPS capsule, Take 200 mg by mouth daily, Disp: , Rfl:     Multiple Vitamins-Minerals (THERAPEUTIC MULTIVITAMIN-MINERALS) tablet, Take 2 tablets by mouth daily , Disp: , Rfl:     Cyanocobalamin (VITAMIN B 12 PO), Take 2,000 mcg by mouth daily, Disp: , Rfl:     metFORMIN (GLUCOPHAGE) 500 MG tablet, Take 1,000 mg by mouth 2 times daily (with meals), Disp: , Rfl:     rosuvastatin (CRESTOR) 20 MG tablet, Take 20 mg by mouth daily, Disp: , Rfl:     Insulin Aspart (NOVOLOG SC), Inject into the skin 4 times daily Dose varies on sliding scale base on blood sugar. , Disp: , Rfl:     Insulin Detemir (LEVEMIR FLEXTOUCH SC), Inject into the skin every 12 hours Dose varies; uses sliding scale, Disp: , Rfl:   Allergies   Allergen Reactions    Iodine     Levaquin [Levofloxacin]     Lipitor [Atorvastatin]            Past Medical History:   Diagnosis Date    Hyperlipidemia     Hypertension  DM  OA      No family history on file.    Past Surgical History:   Procedure Laterality Date    CARDIAC CATHETERIZATION  02/20/2018    Dr Dalia Penaloza  CABG x3   AVR      Social History     Socioeconomic History    Marital status:      Spouse name: Not on file    Number of children: Not on file    Years of education: Not on file    Highest education level: Not on file   Occupational History    Not on file   Social Needs    Financial resource strain: Not on file    Food insecurity:     Worry: Not on file     Inability: Not on file    Transportation needs:     Medical: Not on file     Non-medical: Not on file   Tobacco Use    Smoking status: Never Smoker    Smokeless tobacco: Never Used   Substance and Sexual Activity    Alcohol use: No    Drug use: Never    Sexual activity: Not Currently     Partners: Female   Lifestyle    Physical activity:     Days per week: Not on file     Minutes per session: Not on file    Stress: Not on file   Relationships    Social connections:     Talks on phone: Not on file     Gets together: Not on file     Attends Mu-ism service: Not on file     Active member of club or organization: Not on file     Attends meetings of clubs or organizations: Not on file     Relationship status: Not on file    Intimate partner violence:     Fear of current or ex partner: Not on file     Emotionally abused: Not on file     Physically abused: Not on file     Forced sexual activity: Not on file   Other Topics Concern    Not on file   Social History Narrative    Not on file      Social History     Social History Narrative    Not on file        Vitals:    06/24/19 1511   BP: 132/64   Site: Left Upper Arm   Position: Sitting   Pulse: 53   Temp: 98.2 °F (36.8 °C)   TempSrc: Temporal   SpO2: 97%   Weight: 242 lb 3.2 oz (109.9 kg)   Height: 6' 2\" (1.88 m)        Physical Exam   Constitutional: He is oriented to person, place, and time. He appears well-developed and well-nourished. HENT:   Head: Normocephalic. Right Ear: External ear normal.   Left Ear: External ear normal.   Mouth/Throat: Oropharynx is clear and moist.   Eyes: Pupils are equal, round, and reactive to light. Conjunctivae and EOM are normal.   Neck: Normal range of motion. No thyromegaly present. Cardiovascular: Normal rate, regular rhythm and intact distal pulses. Pulmonary/Chest: Effort normal and breath sounds normal. He has no wheezes. He has no rales. He exhibits no tenderness. Abdominal: Soft. Bowel sounds are normal. He exhibits no distension and no mass. There is no tenderness. There is no rebound and no guarding. Musculoskeletal: Normal range of motion. TTP - base of right thumb  TTP bilateral knees, decreased range of motion   Fusion of right first MTP   Hyperpigmentation of bilateral low extremities    Lymphadenopathy:     He has no cervical adenopathy. Neurological: He is alert and oriented to person, place, and time. Skin: Skin is warm and dry. Capillary refill takes less than 2 seconds. No rash noted. Psychiatric: He has a normal mood and affect. His behavior is normal.        Jonathan Zee was seen today for joint pain and joint swelling. Diagnoses and all orders for this visit:    Primary localized osteoarthrosis of lower leg, unspecified laterality  -     Comprehensive Metabolic Panel; Future  -     CBC Auto Differential; Future  -     C-Reactive Protein; Future  -     Sedimentation Rate; Future  -     Rheumatoid Factor; Future  -     Cyclic Citrul Peptide Antibody, IgG; Future  -     Uric Acid; Future  -     Urinalysis; Future  -     Anti-DNA Antibody, Double-Stranded; Future  -     Anti-Key 1 Antibody, IgG;  Future  - Anti-Mitochondrial Titer; Future  -     ANTI-RNP (JULISSA AB); Future  -     Anti-Scleroderma Antibody; Future  -     Protein / creatinine ratio, urine; Future  -     Oropeza (JULISSA) Antibody IgG; Future  -     Sjogren's Ab (SS-A, SS-B); Future  -     LAILA; Future  -     XR KNEE RIGHT (1-2 VIEWS); Future  -     XR KNEE LEFT (1-2 VIEWS); Future  -     External Referral To Physical Therapy     Symptoms are secondary to osteoarthritis. I willobtain labs to rule out inflammatory etiologies of knee pain. Will obtain baseline x-ray. Rrecommend to start topical Voltaren gel  after discussing with cardiologist. Referral was placed for physical therapist.    Return in about 2 weeks (around 7/8/2019). Cyndi Hammond MD     *This document was created using voice recognition software. Note was reviewed, however may contain grammatical errors.

## 2019-06-26 LAB
ANA PATTERN: ABNORMAL
ANA TITER: 1.32
ANTI DNA DOUBLE STRANDED: NEGATIVE
ANTI JO-1 IGG: NEGATIVE
ANTI-MITOCHONDRIAL AB, IFA: NEGATIVE
ANTI-NUCLEAR ANTIBODY (ANA): POSITIVE
CCP IGG ANTIBODIES: 4 UNITS (ref 0–19)
ENA TO RNP ANTIBODY: NEGATIVE
ENA TO SMITH (SM) ANTIBODY: NEGATIVE
ENA TO SSA (RO) ANTIBODY: NEGATIVE
ENA TO SSB (LA) ANTIBODY: NEGATIVE
SCLERODERMA (SCL-70) AB: NEGATIVE

## 2019-07-08 ENCOUNTER — OFFICE VISIT (OUTPATIENT)
Dept: RHEUMATOLOGY | Age: 69
End: 2019-07-08
Payer: COMMERCIAL

## 2019-07-08 VITALS
TEMPERATURE: 97.9 F | SYSTOLIC BLOOD PRESSURE: 132 MMHG | HEART RATE: 67 BPM | DIASTOLIC BLOOD PRESSURE: 74 MMHG | OXYGEN SATURATION: 97 %

## 2019-07-08 DIAGNOSIS — M17.10 PRIMARY LOCALIZED OSTEOARTHROSIS OF LOWER LEG, UNSPECIFIED LATERALITY: Primary | ICD-10-CM

## 2019-07-08 DIAGNOSIS — R76.0 RAISED ANTIBODY TITER: ICD-10-CM

## 2019-07-08 PROCEDURE — 99214 OFFICE O/P EST MOD 30 MIN: CPT | Performed by: INTERNAL MEDICINE

## 2019-07-08 ASSESSMENT — ENCOUNTER SYMPTOMS
SORE THROAT: 0
ABDOMINAL DISTENTION: 0
EYE REDNESS: 0
BLOOD IN STOOL: 0
EYE DISCHARGE: 0
DIARRHEA: 0
EYE ITCHING: 0
CONSTIPATION: 0
VOMITING: 0
SHORTNESS OF BREATH: 0
SINUS PAIN: 0
SINUS PRESSURE: 0
COLOR CHANGE: 0
ABDOMINAL PAIN: 0
PHOTOPHOBIA: 0
COUGH: 0
WHEEZING: 0
CHEST TIGHTNESS: 0
EYE PAIN: 0
TROUBLE SWALLOWING: 0

## 2019-07-11 ENCOUNTER — HOSPITAL ENCOUNTER (OUTPATIENT)
Dept: PHARMACY | Age: 69
Setting detail: THERAPIES SERIES
Discharge: HOME | End: 2019-07-11
Payer: COMMERCIAL

## 2019-07-11 VITALS
WEIGHT: 252.2 LBS | SYSTOLIC BLOOD PRESSURE: 162 MMHG | HEART RATE: 61 BPM | DIASTOLIC BLOOD PRESSURE: 77 MMHG | BODY MASS INDEX: 32.38 KG/M2

## 2019-07-11 DIAGNOSIS — Z95.2 HISTORY OF MECHANICAL AORTIC VALVE REPLACEMENT: ICD-10-CM

## 2019-07-11 LAB — INTERNATIONAL NORMALIZATION RATIO, POC: 2.4

## 2019-07-11 PROCEDURE — 99211 OFF/OP EST MAY X REQ PHY/QHP: CPT

## 2019-07-11 PROCEDURE — 85610 PROTHROMBIN TIME: CPT

## 2019-07-11 NOTE — PROGRESS NOTES
[address] [facility]    Patient Findings     Positives:   Change in activity (TO START P.T. FOR ARTHRITIS), Extra doses (TOOK 10 MG ON SATURDAYS INSTEAD OF 5 MG AS PRESCRIBED), Change in medications (MAY START VOLTAREN GEL OTC)    Negatives:   Signs/symptoms of thrombosis, Signs/symptoms of bleeding, Laboratory test error suspected, Change in health, Change in alcohol use, Upcoming invasive procedure, Emergency department visit, Upcoming dental procedure, Missed doses, Change in diet/appetite ([place] ([employer]), Hospital admission, Bruising, Other complaints         Patient  reports that he has never smoked. He has never used smokeless tobacco.     Assessment/Plan:  Warfarin indication: mechanical  AVR    INR today is therapeutic at 2.4, goal is 2-3. Warfarin Dose: SAME, PATIENT HAS BEEN TAKING 5 MG EVERY M/W/F; 10 MG ALL OTHER DAYS FOR THE PAST 3 WEEKS AND WILL THEREFORE CONTINUE THIS DOSE    Follow Up: 4 weeks    Patient understands dosing directions and information discussed. Dosing schedule and follow up appointment given to patient. Patient acknowledges working in consult agreement with pharmacist as referred by his/her physician.     Ernie Palacios PharmD 7/11/2019 4:14 PM

## 2019-08-08 ENCOUNTER — HOSPITAL ENCOUNTER (OUTPATIENT)
Dept: PHARMACY | Age: 69
Setting detail: THERAPIES SERIES
Discharge: HOME | End: 2019-08-08
Payer: COMMERCIAL

## 2019-08-08 VITALS
HEART RATE: 66 BPM | BODY MASS INDEX: 32.12 KG/M2 | WEIGHT: 250.2 LBS | DIASTOLIC BLOOD PRESSURE: 81 MMHG | SYSTOLIC BLOOD PRESSURE: 154 MMHG

## 2019-08-08 DIAGNOSIS — Z95.2 HISTORY OF MECHANICAL AORTIC VALVE REPLACEMENT: ICD-10-CM

## 2019-08-08 LAB — INTERNATIONAL NORMALIZATION RATIO, POC: 2.2

## 2019-08-08 PROCEDURE — 99211 OFF/OP EST MAY X REQ PHY/QHP: CPT

## 2019-08-08 PROCEDURE — 85610 PROTHROMBIN TIME: CPT

## 2019-08-08 NOTE — PROGRESS NOTES
[address] [facility]    Patient Findings     Positives:   Change in activity (MAY START P.T. SOON, WAS WAITING FOR NEW INSURANCE)    Negatives:   Signs/symptoms of thrombosis, Signs/symptoms of bleeding, Laboratory test error suspected, Change in health, Change in alcohol use, Upcoming invasive procedure, Emergency department visit, Upcoming dental procedure, Missed doses, Extra doses, Change in medications, Change in diet/appetite, Hospital admission, Bruising, Other complaints         Patient  reports that he has never smoked. He has never used smokeless tobacco.     Assessment/Plan:  Warfarin indication: mechanical AVR      INR today is therapeutic at 2.2, goal is 2-3. Warfarin Dose: same (5 mg every M/W/F; 10 mg all other days)    Follow Up: 4 weeks    Patient understands dosing directions and information discussed. Dosing schedule and follow up appointment given to patient. Patient acknowledges working in consult agreement with pharmacist as referred by his/her physician.     Warren Ramos PharmD 8/8/2019 4:12 PM

## 2019-08-12 RX ORDER — WARFARIN SODIUM 10 MG/1
TABLET ORAL
Qty: 90 TABLET | Refills: 3 | Status: SHIPPED | OUTPATIENT
Start: 2019-08-12 | End: 2019-12-19 | Stop reason: SDUPTHER

## 2019-09-05 ENCOUNTER — HOSPITAL ENCOUNTER (OUTPATIENT)
Dept: PHARMACY | Age: 69
Setting detail: THERAPIES SERIES
Discharge: HOME | End: 2019-09-05
Payer: COMMERCIAL

## 2019-09-05 VITALS
HEART RATE: 64 BPM | WEIGHT: 252.2 LBS | DIASTOLIC BLOOD PRESSURE: 73 MMHG | BODY MASS INDEX: 32.38 KG/M2 | SYSTOLIC BLOOD PRESSURE: 145 MMHG

## 2019-09-05 DIAGNOSIS — Z95.2 HISTORY OF MECHANICAL AORTIC VALVE REPLACEMENT: ICD-10-CM

## 2019-09-05 LAB — INTERNATIONAL NORMALIZATION RATIO, POC: 2.4

## 2019-09-05 PROCEDURE — 99211 OFF/OP EST MAY X REQ PHY/QHP: CPT

## 2019-09-05 PROCEDURE — 85610 PROTHROMBIN TIME: CPT

## 2019-10-03 ENCOUNTER — HOSPITAL ENCOUNTER (OUTPATIENT)
Dept: PHARMACY | Age: 69
Setting detail: THERAPIES SERIES
Discharge: HOME | End: 2019-10-03
Payer: COMMERCIAL

## 2019-10-03 VITALS
HEART RATE: 49 BPM | BODY MASS INDEX: 32.56 KG/M2 | DIASTOLIC BLOOD PRESSURE: 81 MMHG | SYSTOLIC BLOOD PRESSURE: 162 MMHG | WEIGHT: 253.6 LBS

## 2019-10-03 DIAGNOSIS — Z95.2 HISTORY OF MECHANICAL AORTIC VALVE REPLACEMENT: ICD-10-CM

## 2019-10-03 LAB — INR BLD: 3.3

## 2019-10-03 PROCEDURE — 85610 PROTHROMBIN TIME: CPT

## 2019-10-03 PROCEDURE — 99211 OFF/OP EST MAY X REQ PHY/QHP: CPT

## 2019-10-03 RX ORDER — OMEPRAZOLE 20 MG/1
20 CAPSULE, DELAYED RELEASE ORAL
COMMUNITY

## 2019-10-24 ENCOUNTER — HOSPITAL ENCOUNTER (OUTPATIENT)
Dept: PHARMACY | Age: 69
Setting detail: THERAPIES SERIES
Discharge: HOME | End: 2019-10-24
Payer: COMMERCIAL

## 2019-10-24 VITALS
BODY MASS INDEX: 32.48 KG/M2 | WEIGHT: 253 LBS | SYSTOLIC BLOOD PRESSURE: 124 MMHG | DIASTOLIC BLOOD PRESSURE: 73 MMHG | HEART RATE: 57 BPM

## 2019-10-24 DIAGNOSIS — Z95.2 HISTORY OF MECHANICAL AORTIC VALVE REPLACEMENT: ICD-10-CM

## 2019-10-24 LAB — INTERNATIONAL NORMALIZATION RATIO, POC: 2.9

## 2019-10-24 PROCEDURE — 85610 PROTHROMBIN TIME: CPT

## 2019-10-24 PROCEDURE — 99211 OFF/OP EST MAY X REQ PHY/QHP: CPT

## 2019-11-21 ENCOUNTER — HOSPITAL ENCOUNTER (OUTPATIENT)
Dept: PHARMACY | Age: 69
Setting detail: THERAPIES SERIES
Discharge: HOME | End: 2019-11-21
Payer: COMMERCIAL

## 2019-11-21 VITALS
DIASTOLIC BLOOD PRESSURE: 83 MMHG | BODY MASS INDEX: 32.79 KG/M2 | WEIGHT: 255.4 LBS | HEART RATE: 65 BPM | SYSTOLIC BLOOD PRESSURE: 165 MMHG

## 2019-11-21 DIAGNOSIS — Z95.2 HISTORY OF MECHANICAL AORTIC VALVE REPLACEMENT: ICD-10-CM

## 2019-11-21 LAB — INTERNATIONAL NORMALIZATION RATIO, POC: 2.3

## 2019-11-21 PROCEDURE — 85610 PROTHROMBIN TIME: CPT

## 2019-11-21 PROCEDURE — 99211 OFF/OP EST MAY X REQ PHY/QHP: CPT

## 2019-12-19 ENCOUNTER — HOSPITAL ENCOUNTER (OUTPATIENT)
Dept: PHARMACY | Age: 69
Setting detail: THERAPIES SERIES
Discharge: HOME | End: 2019-12-19
Payer: COMMERCIAL

## 2019-12-19 VITALS
HEART RATE: 69 BPM | SYSTOLIC BLOOD PRESSURE: 172 MMHG | BODY MASS INDEX: 33.23 KG/M2 | DIASTOLIC BLOOD PRESSURE: 89 MMHG | WEIGHT: 258.8 LBS

## 2019-12-19 DIAGNOSIS — Z95.2 HISTORY OF MECHANICAL AORTIC VALVE REPLACEMENT: ICD-10-CM

## 2019-12-19 LAB — INTERNATIONAL NORMALIZATION RATIO, POC: 2.3

## 2019-12-19 PROCEDURE — 85610 PROTHROMBIN TIME: CPT

## 2019-12-19 PROCEDURE — 99211 OFF/OP EST MAY X REQ PHY/QHP: CPT

## 2019-12-19 RX ORDER — WARFARIN SODIUM 5 MG/1
TABLET ORAL
Qty: 90 TABLET | Refills: 3 | Status: SHIPPED | OUTPATIENT
Start: 2019-12-19 | End: 2020-10-08 | Stop reason: SDUPTHER

## 2019-12-19 RX ORDER — WARFARIN SODIUM 10 MG/1
TABLET ORAL
Qty: 90 TABLET | Refills: 3 | Status: SHIPPED | OUTPATIENT
Start: 2019-12-19 | End: 2020-10-08 | Stop reason: SDUPTHER

## 2020-01-23 ENCOUNTER — HOSPITAL ENCOUNTER (OUTPATIENT)
Dept: PHARMACY | Age: 70
Setting detail: THERAPIES SERIES
Discharge: HOME | End: 2020-01-23
Payer: COMMERCIAL

## 2020-01-23 VITALS
SYSTOLIC BLOOD PRESSURE: 135 MMHG | DIASTOLIC BLOOD PRESSURE: 76 MMHG | WEIGHT: 251.2 LBS | HEART RATE: 65 BPM | BODY MASS INDEX: 32.25 KG/M2

## 2020-01-23 LAB — INTERNATIONAL NORMALIZATION RATIO, POC: 3.6

## 2020-01-23 PROCEDURE — 85610 PROTHROMBIN TIME: CPT

## 2020-01-23 PROCEDURE — 99211 OFF/OP EST MAY X REQ PHY/QHP: CPT

## 2020-02-20 ENCOUNTER — HOSPITAL ENCOUNTER (OUTPATIENT)
Dept: PHARMACY | Age: 70
Setting detail: THERAPIES SERIES
Discharge: HOME | End: 2020-02-20
Payer: COMMERCIAL

## 2020-02-20 VITALS
SYSTOLIC BLOOD PRESSURE: 169 MMHG | HEART RATE: 66 BPM | BODY MASS INDEX: 33.61 KG/M2 | DIASTOLIC BLOOD PRESSURE: 85 MMHG | WEIGHT: 261.8 LBS

## 2020-02-20 LAB — INTERNATIONAL NORMALIZATION RATIO, POC: 2.7

## 2020-02-20 PROCEDURE — 85610 PROTHROMBIN TIME: CPT

## 2020-02-20 PROCEDURE — 99211 OFF/OP EST MAY X REQ PHY/QHP: CPT

## 2020-02-20 NOTE — PROGRESS NOTES
[address] [facility]    Patient Findings     Positives:   Upcoming dental procedure (PATIENT TO HAVE 2 DENTAL ROOTS EXTRACTED ON 2/22)    Negatives:   Signs/symptoms of thrombosis, Signs/symptoms of bleeding, Laboratory test error suspected, Change in health, Change in alcohol use, Change in activity, Upcoming invasive procedure, Emergency department visit, Missed doses, Extra doses, Change in medications, Change in diet/appetite, Hospital admission, Bruising, Other complaints         Patient  reports that he has never smoked. He has never used smokeless tobacco.     Assessment/Plan:  Warfarin indication: mechanical AVR      INR today is therapeutic at 2.7, goal is 2-3. Warfarin Dose: DR. Winters Morning TOLD PATIENT IT IS OK TO HOLD WARFARIN 1-2 DAYS PRIOR TO DENTAL PROCEDURE. I ADVISED PATIENT TO TAKE ONLY 5 MG TODAY (INSTEAD OF 10 MG), THEN HOLD 5 MG DOSE TOMORROW. HE SHOULD RESUME USUAL WARFARIN DOSE ON 2/22 (5 MG EVERY M/W/F; 10 MG ALL OTHER DAYS)    Follow Up: 4 weeks    Patient understands dosing directions and information discussed. Dosing schedule and follow up appointment given to patient. Patient acknowledges working in consult agreement with pharmacist as referred by his/her physician.     Jayce Pate PharmD 2/20/2020 4:13 PM

## 2020-03-19 ENCOUNTER — HOSPITAL ENCOUNTER (OUTPATIENT)
Dept: PHARMACY | Age: 70
Setting detail: THERAPIES SERIES
Discharge: HOME | End: 2020-03-19
Payer: COMMERCIAL

## 2020-03-19 VITALS — BODY MASS INDEX: 33.43 KG/M2 | WEIGHT: 260.4 LBS

## 2020-03-19 LAB — INTERNATIONAL NORMALIZATION RATIO, POC: 2.6

## 2020-03-19 PROCEDURE — 99211 OFF/OP EST MAY X REQ PHY/QHP: CPT

## 2020-03-19 PROCEDURE — 85610 PROTHROMBIN TIME: CPT

## 2020-04-22 ENCOUNTER — TELEPHONE (OUTPATIENT)
Dept: PHARMACY | Age: 70
End: 2020-04-22

## 2020-04-30 ENCOUNTER — HOSPITAL ENCOUNTER (OUTPATIENT)
Dept: PHARMACY | Age: 70
Setting detail: THERAPIES SERIES
Discharge: HOME | End: 2020-04-30
Payer: COMMERCIAL

## 2020-04-30 LAB — INTERNATIONAL NORMALIZATION RATIO, POC: 2.8

## 2020-04-30 PROCEDURE — 85610 PROTHROMBIN TIME: CPT

## 2020-04-30 PROCEDURE — 99211 OFF/OP EST MAY X REQ PHY/QHP: CPT

## 2020-06-11 ENCOUNTER — HOSPITAL ENCOUNTER (OUTPATIENT)
Dept: PHARMACY | Age: 70
Setting detail: THERAPIES SERIES
Discharge: HOME | End: 2020-06-11
Payer: COMMERCIAL

## 2020-06-11 VITALS
SYSTOLIC BLOOD PRESSURE: 133 MMHG | HEART RATE: 64 BPM | TEMPERATURE: 97 F | WEIGHT: 248.6 LBS | DIASTOLIC BLOOD PRESSURE: 74 MMHG | BODY MASS INDEX: 31.92 KG/M2

## 2020-06-11 LAB — INTERNATIONAL NORMALIZATION RATIO, POC: 1.5

## 2020-06-11 PROCEDURE — 85610 PROTHROMBIN TIME: CPT

## 2020-06-11 PROCEDURE — 99211 OFF/OP EST MAY X REQ PHY/QHP: CPT

## 2020-06-11 NOTE — PROGRESS NOTES
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 He has never used smokeless tobacco.     Assessment/Plan:  Warfarin indication: mechanical AVR      INR today is SUBtherapeutic at 1.5, goal is 2-3. NO KNOWN REASON OTHER THAN POSSIBLE DIET CHANGE. Warfarin Dose: 15 MG TODAY, 10 MG TOMORROW THEN RESUME 5 MG EVERY M/W/F; 10 MG ALL OTHER DAYS    Follow Up: 1 week    Patient understands dosing directions and information discussed. Dosing schedule and follow up appointment given to patient. Patient acknowledges working in consult agreement with pharmacist as referred by his/her physician. Patient passed the COVID-19 screening as per the [address] [place] [address] COVID-19 Screening Protocol. Marita Walters PharmD 6/11/2020 4:30 PM     CLINICAL PHARMACY CONSULT: MED RECONCILIATION/REVIEW ADDENDUM    For Pharmacy Admin Tracking Only    PHSO: No  Total # of Interventions Recommended: 1  - Increased Dose #: 1  - Maintenance Safety Lab Monitoring #: 1  Recommended intervention potential cost savings:   Accepted intervention potential cost savings:    Total Interventions Accepted: 1  Time Spent (min): [address] [place] [address], PharmD

## 2020-06-18 ENCOUNTER — HOSPITAL ENCOUNTER (OUTPATIENT)
Dept: PHARMACY | Age: 70
Setting detail: THERAPIES SERIES
Discharge: HOME | End: 2020-06-18
Payer: COMMERCIAL

## 2020-06-18 VITALS
HEART RATE: 65 BPM | DIASTOLIC BLOOD PRESSURE: 79 MMHG | WEIGHT: 245.8 LBS | SYSTOLIC BLOOD PRESSURE: 148 MMHG | BODY MASS INDEX: 31.56 KG/M2

## 2020-06-18 LAB — INTERNATIONAL NORMALIZATION RATIO, POC: 2.2

## 2020-06-18 PROCEDURE — 99211 OFF/OP EST MAY X REQ PHY/QHP: CPT

## 2020-06-18 PROCEDURE — 85610 PROTHROMBIN TIME: CPT

## 2020-07-16 ENCOUNTER — TELEPHONE (OUTPATIENT)
Dept: PHARMACY | Age: 70
End: 2020-07-16

## 2020-07-16 NOTE — TELEPHONE ENCOUNTER
Patient called and left message this morning to cancel his appt for today (7/16 @ 4 pm). Patient stated his secondary insurance will not cover the remainder of visit charge, after primary is billed. Stated that social security needs to finish his application. Although he and I spoke yesterday about the process of re-submitting a bill if his insurance is backdated from when we see him, he did not want to chance the billing headache. Patient stated he will call us back to schedule as soon as his insurance is appropriately set up.     Electronically signed by Brad Wagoner on 7/16/20 at 1:21 PM EDT

## 2020-08-03 NOTE — TELEPHONE ENCOUNTER
Patient called to make APPT due to knew INS cards, on the schedule for 08/13/2020 at 4pm. Electronically signed by Thee Ca on 8/3/20 at 3:37 PM EDT

## 2020-08-05 NOTE — TELEPHONE ENCOUNTER
Called and spoke with patient. Cancelled appt for 8/13, rescheduled for tomorrow (8/6 @ 1:30 pm). Patient will bring new insurance cards to be entered / scanned into chart during check-in.     Electronically signed by Levy House on 8/5/20 at 2:02 PM EDT

## 2020-08-06 ENCOUNTER — HOSPITAL ENCOUNTER (OUTPATIENT)
Dept: PHARMACY | Age: 70
Setting detail: THERAPIES SERIES
Discharge: HOME | End: 2020-08-06
Payer: MEDICARE

## 2020-08-06 VITALS
WEIGHT: 243.2 LBS | BODY MASS INDEX: 31.23 KG/M2 | TEMPERATURE: 97.4 F | HEART RATE: 58 BPM | DIASTOLIC BLOOD PRESSURE: 73 MMHG | SYSTOLIC BLOOD PRESSURE: 135 MMHG

## 2020-08-06 LAB — INTERNATIONAL NORMALIZATION RATIO, POC: 3

## 2020-08-06 PROCEDURE — 99211 OFF/OP EST MAY X REQ PHY/QHP: CPT

## 2020-08-06 PROCEDURE — 85610 PROTHROMBIN TIME: CPT

## 2020-08-06 NOTE — PROGRESS NOTES
[address] [facility] [address]    Patient Findings     Negatives:   Signs/symptoms of thrombosis, Signs/symptoms of bleeding, Laboratory test error suspected, Change in health, Change in alcohol use, Change in activity, Upcoming invasive procedure, Emergency department visit, Upcoming dental procedure, Missed doses, Extra doses, Change in medications, Change in diet/appetite, Hospital admission, Bruising, Other complaints         Patient  reports that he has never smoked. He has never used smokeless tobacco.     Assessment/Plan:  Warfarin indication: mechanical AVR      INR today is therapeutic at 3, goal is 2-3. Warfarin Dose: same (5 mg every Mon./Fri.; 10 mg all other days)    Follow Up: 4 weeks    Patient understands dosing directions and information discussed. Dosing schedule and follow up appointment given to patient. Patient acknowledges working in consult agreement with pharmacist as referred by his/her physician. Patient passed the COVID-19 screening as per the [address] [place] [address] COVID-19 Screening Protocol. Onelia IbarraD 8/6/2020 1:37 PM    CLINICAL PHARMACY CONSULT: MED RECONCILIATION/REVIEW ADDENDUM    For Pharmacy Admin Tracking Only    PHSO: No  Total # of Interventions Recommended: 0    - Maintenance Safety Lab Monitoring #: 1  Recommended intervention potential cost savings:   Accepted intervention potential cost savings:    Total Interventions Accepted: 0  Time Spent (min): [address], PharmD  M

## 2020-09-03 ENCOUNTER — HOSPITAL ENCOUNTER (OUTPATIENT)
Dept: PHARMACY | Age: 70
Setting detail: THERAPIES SERIES
Discharge: HOME | End: 2020-09-03
Payer: MEDICARE

## 2020-09-03 VITALS
TEMPERATURE: 96.8 F | DIASTOLIC BLOOD PRESSURE: 76 MMHG | BODY MASS INDEX: 31.33 KG/M2 | WEIGHT: 244 LBS | SYSTOLIC BLOOD PRESSURE: 142 MMHG | HEART RATE: 62 BPM

## 2020-09-03 LAB — INTERNATIONAL NORMALIZATION RATIO, POC: 3.1

## 2020-09-03 PROCEDURE — 85610 PROTHROMBIN TIME: CPT

## 2020-09-03 PROCEDURE — 99211 OFF/OP EST MAY X REQ PHY/QHP: CPT

## 2020-09-03 NOTE — PROGRESS NOTES
[address]    Patient Findings     Negatives:   Signs/symptoms of thrombosis, Signs/symptoms of bleeding, Laboratory test error suspected, Change in health, Change in alcohol use, Change in activity, Upcoming invasive procedure, Emergency department visit, Upcoming dental procedure, Missed doses, Extra doses, Change in medications, Change in diet/appetite (HAS A SALAD EVERY 2-3 DAYS), Hospital admission, Bruising, Other complaints    Comments:   EYE APPT. WITH DR. Jett Jaramillo         Patient  reports that he has never smoked. He has never used smokeless tobacco.     Assessment/Plan:  Warfarin indication: mechanical AVR      INR today is SLIGHTLY SUPRAtherapeutic at 3.1, goal is 2-3. LAST INR WAS 3; NO REASON FOR INCREASE IN INR. Warfarin Dose: SINCE INR JUST SLIGHTLY HIGH, WILL CONTINUE SAME DOSE (5 MG EVERY MON./FRI.; 10 MG ALL OTHER DAYS) AND ADD A LITTLE MORE VITAMIN K INTO DIET    Follow Up: 4 weeks    Patient understands dosing directions and information discussed. Dosing schedule and follow up appointment given to patient. Patient acknowledges working in consult agreement with pharmacist as referred by his/her physician. Patient passed the COVID-19 screening as per the [address] [place] [address] COVID-19 Screening Protocol. Dinesh Colón PharmD 9/3/2020 4:13 PM    CLINICAL PHARMACY CONSULT: MED RECONCILIATION/REVIEW ADDENDUM    For Pharmacy Admin Tracking Only    PHSO: No  Total # of Interventions Recommended: 0    - Maintenance Safety Lab Monitoring #: 1  Recommended intervention potential cost savings:   Accepted intervention potential cost savings:    Total Interventions Accepted: 0  Time Spent (min): 323 [address], PharmXAVIER

## 2020-10-08 ENCOUNTER — HOSPITAL ENCOUNTER (OUTPATIENT)
Dept: PHARMACY | Age: 70
Setting detail: THERAPIES SERIES
Discharge: HOME | End: 2020-10-08
Payer: MEDICARE

## 2020-10-08 VITALS
SYSTOLIC BLOOD PRESSURE: 143 MMHG | DIASTOLIC BLOOD PRESSURE: 74 MMHG | BODY MASS INDEX: 31.61 KG/M2 | WEIGHT: 246.2 LBS | TEMPERATURE: 96.2 F | HEART RATE: 62 BPM

## 2020-10-08 LAB — INTERNATIONAL NORMALIZATION RATIO, POC: 2

## 2020-10-08 PROCEDURE — 99211 OFF/OP EST MAY X REQ PHY/QHP: CPT

## 2020-10-08 PROCEDURE — 85610 PROTHROMBIN TIME: CPT

## 2020-10-08 RX ORDER — WARFARIN SODIUM 5 MG/1
TABLET ORAL
Qty: 90 TABLET | Refills: 3 | Status: SHIPPED | OUTPATIENT
Start: 2020-10-08

## 2020-10-08 RX ORDER — WARFARIN SODIUM 10 MG/1
TABLET ORAL
Qty: 90 TABLET | Refills: 3 | Status: SHIPPED | OUTPATIENT
Start: 2020-10-08

## 2020-10-08 NOTE — PROGRESS NOTES
[address]    Patient Findings     Positives:   Change in diet/appetite (EATING 3 SALADS PER WEEK)    Negatives:   Signs/symptoms of thrombosis, Signs/symptoms of bleeding, Laboratory test error suspected, Change in health, Change in alcohol use, Change in activity, Upcoming invasive procedure, Emergency department visit, Upcoming dental procedure, Missed doses, Extra doses, Change in medications, Hospital admission, Bruising, Other complaints         Patient  reports that he has never smoked. He has never used smokeless tobacco.     Assessment/Plan:  Warfarin indication: mechanical AVR      INR today is therapeutic at 2.0, goal is 2-3. Warfarin Dose: same (5 mg every Mon, Fri; 10 mg all other days)    Follow Up: 4 weeks    Patient understands dosing directions and information discussed. Dosing schedule and follow up appointment given to patient. Patient acknowledges working in consult agreement with pharmacist as referred by his/her physician. Patient passed the COVID-19 screening as per the [address] [place] [address] COVID-19 Screening Protocol. Stacey KatD Candidate 10/8/2020 4:17 PM     CLINICAL PHARMACY CONSULT: MED RECONCILIATION/REVIEW ADDENDUM    For Pharmacy Admin Tracking Only    PHSO: No  Total # of Interventions Recommended: 1  - Refills Provided #: 1  - Maintenance Safety Lab Monitoring #: 1  Recommended intervention potential cost savings:   Accepted intervention potential cost savings:    Total Interventions Accepted: 1  Time Spent (min): 323 [address], PharmD

## 2020-11-05 ENCOUNTER — HOSPITAL ENCOUNTER (OUTPATIENT)
Dept: PHARMACY | Age: 70
Setting detail: THERAPIES SERIES
Discharge: HOME | End: 2020-11-05
Payer: MEDICARE

## 2020-11-05 VITALS
DIASTOLIC BLOOD PRESSURE: 71 MMHG | TEMPERATURE: 97.3 F | BODY MASS INDEX: 31.97 KG/M2 | HEART RATE: 67 BPM | WEIGHT: 249 LBS | SYSTOLIC BLOOD PRESSURE: 131 MMHG

## 2020-11-05 LAB — INTERNATIONAL NORMALIZATION RATIO, POC: 3.2

## 2020-11-05 PROCEDURE — 99211 OFF/OP EST MAY X REQ PHY/QHP: CPT

## 2020-11-05 PROCEDURE — 85610 PROTHROMBIN TIME: CPT

## 2020-11-05 NOTE — PROGRESS NOTES
[address]    Patient Findings     Positives:   Change in health (HE STATES THAT HE HAS A CUT ON HIS NOSE FROM HIS DOG; CONCERNED FOR INFECTION AND UNABLE TO REACH PCP; I ADVISED URGENT CARE)    Negatives:   Signs/symptoms of thrombosis, Signs/symptoms of bleeding, Laboratory test error suspected, Change in alcohol use, Change in activity, Upcoming invasive procedure, Emergency department visit, Upcoming dental procedure, Missed doses, Extra doses, Change in medications, Change in diet/appetite, Hospital admission, Bruising, Other complaints    Comments:   DR. Jacquie Avalos IN DEC. Patient  reports that he has never smoked. He has never used smokeless tobacco.     Assessment/Plan:  Warfarin indication: mechanical AVR      INR today is SLIGHTLY SUPRAtherapeutic at 3.2, goal is 2-3. NO KNOWN CAUSE FOR HIGH INR. HIS LAST INR WAS 2.0 AND 3.1 THE TIME BEFORE THAT. Warfarin Dose: CUT TODAY'S DOSE IN HALF (5 MG) THEN RESUME SAME SCHEDULE OF 5 MG EVERY MON./FRI.; 10 MG ALL OTHER DAYS    Follow Up: 4 weeks    Patient understands dosing directions and information discussed. Dosing schedule and follow up appointment given to patient. Patient acknowledges working in consult agreement with pharmacist as referred by his/her physician. Patient passed the COVID-19 screening as per the [address] [place] [address] COVID-19 Screening Protocol. Jovany Torrez PharmD 11/5/2020 4:13 PM    CLINICAL PHARMACY CONSULT: MED RECONCILIATION/REVIEW ADDENDUM    For Pharmacy Admin Tracking Only    PHSO: No  Total # of Interventions Recommended: 1  - Decreased Dose #: 1  - Maintenance Safety Lab Monitoring #: 1  Recommended intervention potential cost savings:   Accepted intervention potential cost savings:    Total Interventions Accepted: 1  Time Spent (min): [address] [place] [address], PharmD

## 2020-12-03 ENCOUNTER — HOSPITAL ENCOUNTER (OUTPATIENT)
Dept: PHARMACY | Age: 70
Setting detail: THERAPIES SERIES
Discharge: HOME | End: 2020-12-03
Payer: MEDICARE

## 2020-12-03 VITALS
DIASTOLIC BLOOD PRESSURE: 76 MMHG | TEMPERATURE: 97.2 F | WEIGHT: 248.2 LBS | BODY MASS INDEX: 31.87 KG/M2 | SYSTOLIC BLOOD PRESSURE: 173 MMHG | HEART RATE: 74 BPM

## 2020-12-03 LAB — INTERNATIONAL NORMALIZATION RATIO, POC: 2.7

## 2020-12-03 PROCEDURE — 99211 OFF/OP EST MAY X REQ PHY/QHP: CPT

## 2020-12-03 PROCEDURE — 85610 PROTHROMBIN TIME: CPT

## 2020-12-03 NOTE — PROGRESS NOTES
[address]    Patient Findings     Negatives:   Signs/symptoms of thrombosis, Signs/symptoms of bleeding, Laboratory test error suspected, Change in health, Change in alcohol use, Change in activity, Upcoming invasive procedure, Emergency department visit, Upcoming dental procedure, Missed doses, Extra doses, Change in medications, Change in diet/appetite, Hospital admission, Bruising, Other complaints    Comments:   DR. Stover [facility]         Patient  reports that he has never smoked. He has never used smokeless tobacco.     Assessment/Plan:  Warfarin indication: mechanical AVR      INR today is therapeutic at 2.7, goal is 2-3. Warfarin Dose: same (5 mg every Mon./Fri.; 10 mg all other days)    Follow Up: 5 weeks    Patient understands dosing directions and information discussed. Dosing schedule and follow up appointment given to patient. Patient acknowledges working in consult agreement with pharmacist as referred by his/her physician. Patient passed the COVID-19 screening as per the [address] [place] [address] COVID-19 Screening Protocol. Carline Nunez PharmD 12/3/2020 4:08 PM    CLINICAL PHARMACY CONSULT: MED RECONCILIATION/REVIEW ADDENDUM    For Pharmacy Admin Tracking Only    PHSO: No  Total # of Interventions Recommended: 0    - Maintenance Safety Lab Monitoring #: 1  Recommended intervention potential cost savings:   Accepted intervention potential cost savings:    Total Interventions Accepted: 0  Time Spent (min): 323 [address], PharmD

## 2021-01-07 ENCOUNTER — HOSPITAL ENCOUNTER (OUTPATIENT)
Dept: PHARMACY | Age: 71
Setting detail: THERAPIES SERIES
Discharge: HOME | End: 2021-01-07
Payer: MEDICARE

## 2021-01-07 VITALS
BODY MASS INDEX: 31.87 KG/M2 | SYSTOLIC BLOOD PRESSURE: 125 MMHG | HEART RATE: 64 BPM | WEIGHT: 248.2 LBS | DIASTOLIC BLOOD PRESSURE: 71 MMHG

## 2021-01-07 DIAGNOSIS — Z95.2 HISTORY OF MECHANICAL AORTIC VALVE REPLACEMENT: ICD-10-CM

## 2021-01-07 LAB — INTERNATIONAL NORMALIZATION RATIO, POC: 3.1

## 2021-01-07 PROCEDURE — 99211 OFF/OP EST MAY X REQ PHY/QHP: CPT

## 2021-01-07 PROCEDURE — 85610 PROTHROMBIN TIME: CPT

## 2021-01-07 RX ORDER — ASCORBIC ACID 500 MG
2000 TABLET ORAL DAILY
COMMUNITY

## 2021-01-07 RX ORDER — ZINC GLUCONATE 100 MG
100 TABLET ORAL DAILY
COMMUNITY

## 2021-01-07 NOTE — PROGRESS NOTES
[address]    Patient Findings     Positives:  Change in medications (LOSARTAN INCREASED FROM 50 MG TO 75 MG; ALSO, HE STARTED VITAMIN C AND ZINC)    Negatives:  Signs/symptoms of thrombosis, Signs/symptoms of bleeding, Laboratory test error suspected, Change in health, Change in alcohol use, Change in activity, Upcoming invasive procedure, Emergency department visit, Upcoming dental procedure, Missed doses, Extra doses, Change in diet/appetite, Hospital admission, Bruising, Other complaints         Patient  reports that he has never smoked. He has never used smokeless tobacco.     Assessment/Plan:  Warfarin indication: mechanical AVR      INR today is SLIGHTLY SUPRAtherapeutic at 3.1, goal is 2-3. Warfarin Dose: same (5 mg every Mon./Fri.; 10 mg all other days)    Follow Up: 4 weeks    Patient understands dosing directions and information discussed. Dosing schedule and follow up appointment given to patient. Patient acknowledges working in consult agreement with pharmacist as referred by his/her physician. Patient passed the COVID-19 screening as per the [address] [place] [address] COVID-19 Screening Protocol. Stacey Alves PharmD 1/7/2021 4:15 PM    CLINICAL PHARMACY CONSULT: MED RECONCILIATION/REVIEW ADDENDUM    For Pharmacy Admin Tracking Only    PHSO: No  Total # of Interventions Recommended: 0    - Maintenance Safety Lab Monitoring #: 1  Recommended intervention potential cost savings:   Accepted intervention potential cost savings:    Total Interventions Accepted: 0  Time Spent (min): 323 [address], PharmD

## 2021-02-04 ENCOUNTER — HOSPITAL ENCOUNTER (OUTPATIENT)
Dept: PHARMACY | Age: 71
Setting detail: THERAPIES SERIES
Discharge: HOME | End: 2021-02-04
Payer: MEDICARE

## 2021-02-04 VITALS
HEART RATE: 65 BPM | BODY MASS INDEX: 32.25 KG/M2 | SYSTOLIC BLOOD PRESSURE: 121 MMHG | DIASTOLIC BLOOD PRESSURE: 74 MMHG | WEIGHT: 251.2 LBS

## 2021-02-04 DIAGNOSIS — Z95.2 HISTORY OF MECHANICAL AORTIC VALVE REPLACEMENT: ICD-10-CM

## 2021-02-04 LAB — INTERNATIONAL NORMALIZATION RATIO, POC: 1.9

## 2021-02-04 PROCEDURE — 99212 OFFICE O/P EST SF 10 MIN: CPT

## 2021-02-04 PROCEDURE — 85610 PROTHROMBIN TIME: CPT

## 2021-02-04 NOTE — PROGRESS NOTES
[address]    Patient Findings     Positives:  Change in diet/appetite (2-3 SALADS PER WEEK)    Negatives:  Signs/symptoms of thrombosis, Signs/symptoms of bleeding, Laboratory test error suspected, Change in health, Change in alcohol use, Change in activity, Upcoming invasive procedure, Emergency department visit, Upcoming dental procedure, Missed doses, Extra doses, Change in medications, Hospital admission, Bruising, Other complaints    Comments:  RECEIVED 1ST COVID ([address]) VACCINE TODAY         Patient  reports that he has never smoked. He has never used smokeless tobacco.     Assessment/Plan:  Warfarin indication: mechanical AVR      INR today is SUBtherapeutic at 1.9, goal is 2-3. PATIENT STATES HE DID HAVE A LITTLE EXTRA SALAD THIS WEEK. Warfarin Dose: 10 MG TOMORROW OTHERWISE, USUAL SCHEDULE OF 5 MG EVERY MON./FRI.; 10 MG ALL OTHER DAYS    Follow Up: 4 weeks    Patient understands dosing directions and information discussed. Dosing schedule and follow up appointment given to patient. Patient acknowledges working in consult agreement with pharmacist as referred by his/her physician. Patient passed the COVID-19 screening as per the [address] [place] [address] COVID-19 Screening Protocol. Tonio Mckee PharmD 2/4/2021 4:16 PM    CLINICAL PHARMACY CONSULT: MED RECONCILIATION/REVIEW ADDENDUM    For Pharmacy Admin Tracking Only    PHSO: No  Total # of Interventions Recommended: 1  - Increased Dose #: 1  - Maintenance Safety Lab Monitoring #: 1  Recommended intervention potential cost savings:   Accepted intervention potential cost savings:    Total Interventions Accepted: 1  Time Spent (min): 323 [address], PharmD

## 2021-03-11 ENCOUNTER — HOSPITAL ENCOUNTER (OUTPATIENT)
Dept: PHARMACY | Age: 71
Setting detail: THERAPIES SERIES
Discharge: HOME | End: 2021-03-11
Payer: MEDICARE

## 2021-03-11 VITALS
DIASTOLIC BLOOD PRESSURE: 73 MMHG | HEART RATE: 52 BPM | WEIGHT: 250.4 LBS | BODY MASS INDEX: 32.15 KG/M2 | SYSTOLIC BLOOD PRESSURE: 124 MMHG

## 2021-03-11 DIAGNOSIS — Z95.2 HISTORY OF MECHANICAL AORTIC VALVE REPLACEMENT: ICD-10-CM

## 2021-03-11 LAB — INTERNATIONAL NORMALIZATION RATIO, POC: 1.4

## 2021-03-11 PROCEDURE — 99212 OFFICE O/P EST SF 10 MIN: CPT

## 2021-03-11 PROCEDURE — 85610 PROTHROMBIN TIME: CPT

## 2021-03-11 NOTE — PROGRESS NOTES
[address]    Patient Findings     Positives:  Missed doses (MAY HAVE MISSED HIS 10 MG DOSE ON 3/6 AND 3/7)    Negatives:  Signs/symptoms of thrombosis, Signs/symptoms of bleeding, Laboratory test error suspected, Change in health, Change in alcohol use, Change in activity, Upcoming invasive procedure, Emergency department visit, Upcoming dental procedure, Extra doses, Change in medications, Change in diet/appetite, Hospital admission, Bruising, Other complaints    Comments:  [address] [facility]         Patient  reports that he has never smoked. He has never used smokeless tobacco.     Assessment/Plan:  Warfarin indication: mechanical AVR      INR today is SUBtherapeutic at 1.4, goal is 2-3. HE IS NOT SURE IF HE MISSED HIS DOSE ON 3/6 AND 3/7. IF HE DID HE WOULD BE SHORT 20 MG OR 33% FOR THE WEEK. Warfarin Dose: WILL BOOST WITH 15 MG TODAY; 10 MG TOMORROW; THEN BACK TO USUAL SCHEDULE ON 3/13 (5 MG MON./FRI.; 10 MG ALL OTHER DAYS) - IF INR LOW AT NEXT VISIT, WILL NEED TO INCREASE WEEKLY REGIMEN    Follow Up: 2 weeks     Patient understands dosing directions and information discussed. Dosing schedule and follow up appointment given to patient. Patient acknowledges working in consult agreement with pharmacist as referred by his/her physician. Patient passed the COVID-19 screening as per the [address] [place] [address] COVID-19 Screening Protocol. Amanda Steve PharmD 3/11/2021 4:04 PM    CLINICAL PHARMACY CONSULT: MED RECONCILIATION/REVIEW ADDENDUM    For Pharmacy Admin Tracking Only    PHSO: No  Total # of Interventions Recommended: 1  - Increased Dose #: 1  - Maintenance Safety Lab Monitoring #: 1  Recommended intervention potential cost savings:   Accepted intervention potential cost savings:    Total Interventions Accepted: 1  Time Spent (min): 323 [address], PharmD

## 2021-03-26 ENCOUNTER — HOSPITAL ENCOUNTER (OUTPATIENT)
Dept: PHARMACY | Age: 71
Setting detail: THERAPIES SERIES
Discharge: HOME | End: 2021-03-26
Payer: MEDICARE

## 2021-03-26 VITALS — HEART RATE: 60 BPM | DIASTOLIC BLOOD PRESSURE: 83 MMHG | SYSTOLIC BLOOD PRESSURE: 143 MMHG

## 2021-03-26 DIAGNOSIS — Z95.2 HISTORY OF MECHANICAL AORTIC VALVE REPLACEMENT: ICD-10-CM

## 2021-03-26 LAB — INTERNATIONAL NORMALIZATION RATIO, POC: 2.7

## 2021-03-26 PROCEDURE — 85610 PROTHROMBIN TIME: CPT

## 2021-03-26 PROCEDURE — 99211 OFF/OP EST MAY X REQ PHY/QHP: CPT

## 2021-03-26 NOTE — PROGRESS NOTES
[address]    Patient Findings     Negatives:  Signs/symptoms of thrombosis, Signs/symptoms of bleeding, Laboratory test error suspected, Change in health, Change in alcohol use, Change in activity, Upcoming invasive procedure, Emergency department visit, Upcoming dental procedure, Missed doses, Extra doses, Change in medications, Change in diet/appetite, Hospital admission, Bruising, Other complaints         Patient  reports that he has never smoked. He has never used smokeless tobacco.     Assessment/Plan:  Warfarin indication: mechanical AVR      INR today is therapeutic at 2.7, goal is 2-3    Warfarin Dose: SAME (5mg M/F; 10mg all other days)    Follow Up: 5 weeks. (Patient wanted previous schedule of 4 weeks but exact time slot was unavailable so scheduled next 2 appts: 5 weeks for this one and 4 weeks following the next one)    Patient understands dosing directions and information discussed. Dosing schedule and follow up appointment given to patient. Patient acknowledges working in consult agreement with pharmacist as referred by his/her physician. Patient passed the COVID-19 screening as per the [address] [place] [address] COVID-19 Screening Protocol. Heather Rojas PharmD 3/26/2021 4:28 PM    CLINICAL PHARMACY CONSULT: MED RECONCILIATION/REVIEW ADDENDUM    For Pharmacy Admin Tracking Only    PHSO: No  Total # of Interventions Recommended: 0  - Maintenance Safety Lab Monitoring #: 1  Recommended intervention potential cost savings:   Accepted intervention potential cost savings:    Total Interventions Accepted: 0  Time Spent (min): 15    Heather Rojas PharmD

## 2021-04-29 ENCOUNTER — HOSPITAL ENCOUNTER (OUTPATIENT)
Dept: PHARMACY | Age: 71
Setting detail: THERAPIES SERIES
Discharge: HOME | End: 2021-04-29
Payer: MEDICARE

## 2021-04-29 VITALS
SYSTOLIC BLOOD PRESSURE: 126 MMHG | BODY MASS INDEX: 31.79 KG/M2 | WEIGHT: 247.6 LBS | DIASTOLIC BLOOD PRESSURE: 74 MMHG | HEART RATE: 65 BPM

## 2021-04-29 DIAGNOSIS — Z95.2 HISTORY OF MECHANICAL AORTIC VALVE REPLACEMENT: ICD-10-CM

## 2021-04-29 LAB — INTERNATIONAL NORMALIZATION RATIO, POC: 2

## 2021-04-29 PROCEDURE — 85610 PROTHROMBIN TIME: CPT

## 2021-04-29 PROCEDURE — 99211 OFF/OP EST MAY X REQ PHY/QHP: CPT

## 2021-04-29 NOTE — PROGRESS NOTES
[address]    Patient Findings     Negatives:  Signs/symptoms of thrombosis, Signs/symptoms of bleeding, Laboratory test error suspected, Change in health, Change in alcohol use, Change in activity, Upcoming invasive procedure, Emergency department visit, Upcoming dental procedure, Missed doses, Extra doses, Change in medications, Change in diet/appetite, Hospital admission, Bruising, Other complaints    Comments:  DR. Ismael Shook IN June  WILL SEE CNP FOR PCP IN June  EYE APPT. IN [facility]         Patient  reports that he has never smoked. He has never used smokeless tobacco.     Assessment/Plan:  Warfarin indication: mechanical AVR      INR today is therapeutic at 2, goal is 2-3. Warfarin Dose: same (5 mg every Mon./Fri.; 10 mg all other days)    Follow Up: 4 weeks    Patient understands dosing directions and information discussed. Dosing schedule and follow up appointment given to patient. Patient acknowledges working in consult agreement with pharmacist as referred by his/her physician. Randy Duffy PharmD 4/29/2021 4:09 PM    For Pharmacy Admin Tracking Only     Intervention Detail:    Total # of Interventions Recommended: 0   Total # of Interventions Accepted: 0   Time Spent (min): 15    ADDENDUM:   Mr. Fransisco Tan did want wear his mask appropriately while in the waiting room. It did not cover his nose. Rachel Cosyb asked him to pull his mask up but patient refused. Rachel Cosby also offered him one of our medical masks to wear instead as it appeared to be more breathable than the mask the patient was wearing. When I took the patient back into the patient room I discussed with him that he is required to wear the mask appropriately while in the waiting room. Mills Cockayne stated that he gets dizzy and has fallen down a couple times while wearing it over his nose.  I gave him the option of waiting in his car until we are ready for him to come inside.  Rachel Cosby will call him when I am ready to bring him into the patient room. This way, he will not need to wear the mask for an extended period of time while in the waiting room. I also told him that he may pull the mask down when he is in the patient room with just me if he is having difficulty breathing or not feeling well while wearing it.      Daryl Cheung, [place] 4/30/2021 8:35 AM

## 2021-05-27 ENCOUNTER — HOSPITAL ENCOUNTER (OUTPATIENT)
Dept: PHARMACY | Age: 71
Setting detail: THERAPIES SERIES
Discharge: HOME | End: 2021-05-27
Payer: MEDICARE

## 2021-05-27 VITALS
DIASTOLIC BLOOD PRESSURE: 77 MMHG | WEIGHT: 248.6 LBS | BODY MASS INDEX: 31.92 KG/M2 | HEART RATE: 63 BPM | SYSTOLIC BLOOD PRESSURE: 136 MMHG

## 2021-05-27 DIAGNOSIS — Z95.2 HISTORY OF MECHANICAL AORTIC VALVE REPLACEMENT: Primary | ICD-10-CM

## 2021-05-27 LAB — INTERNATIONAL NORMALIZATION RATIO, POC: 2.7

## 2021-05-27 PROCEDURE — 99211 OFF/OP EST MAY X REQ PHY/QHP: CPT

## 2021-05-27 PROCEDURE — 85610 PROTHROMBIN TIME: CPT

## 2021-05-27 NOTE — PROGRESS NOTES
[address]    Patient Findings     Negatives:  Signs/symptoms of thrombosis, Signs/symptoms of bleeding, Laboratory test error suspected, Change in health, Change in alcohol use, Change in activity, Upcoming invasive procedure, Emergency department visit, Upcoming dental procedure, Missed doses, Extra doses, Change in medications, Change in diet/appetite, Hospital admission, Bruising, Other complaints    Comments:  DR. Debora Wetzel IN June  WILL SEE CNP FOR PCP IN June  EYE APPT. IN [facility]         Patient  reports that he has never smoked. He has never used smokeless tobacco.     Assessment/Plan:  Warfarin indication: mechanical AVR        INR today is therapeutic at 2.7, goal is 2-3. Warfarin Dose: same (5 mg every Mon./Fri.; 10 mg all other days)    Follow Up: 4 weeks    Patient understands dosing directions and information discussed. Dosing schedule and follow up appointment given to patient. Patient acknowledges working in consult agreement with pharmacist as referred by his/her physician.     Trav Davis, [address] [place] [address] PharmD 5/27/2021 4:13 PM    For Pharmacy Admin Tracking Only     Intervention Detail:    Total # of Interventions Recommended: 0   Total # of Interventions Accepted: 0   Time Spent (min): 15

## 2021-07-22 ENCOUNTER — HOSPITAL ENCOUNTER (OUTPATIENT)
Dept: PHARMACY | Age: 71
Setting detail: THERAPIES SERIES
Discharge: HOME | End: 2021-07-22
Payer: MEDICARE

## 2021-07-22 VITALS
HEART RATE: 62 BPM | BODY MASS INDEX: 30.84 KG/M2 | DIASTOLIC BLOOD PRESSURE: 78 MMHG | WEIGHT: 240.2 LBS | SYSTOLIC BLOOD PRESSURE: 124 MMHG

## 2021-07-22 DIAGNOSIS — Z95.2 HISTORY OF MECHANICAL AORTIC VALVE REPLACEMENT: Primary | ICD-10-CM

## 2021-07-22 LAB — INTERNATIONAL NORMALIZATION RATIO, POC: 4.4

## 2021-07-22 PROCEDURE — 99212 OFFICE O/P EST SF 10 MIN: CPT | Performed by: PHARMACIST

## 2021-07-22 PROCEDURE — 85610 PROTHROMBIN TIME: CPT | Performed by: PHARMACIST

## 2021-07-22 RX ORDER — CEPHALEXIN 500 MG/1
500 CAPSULE ORAL DAILY
COMMUNITY
End: 2022-02-10

## 2021-07-22 NOTE — PROGRESS NOTES
[address]    Patient Findings     Positives:  Upcoming invasive procedure (TOE SURGERY 1 MONTH AGO, OFF WARFARIN X 3 DAYS), Change in medications (ON KEFLEX ONCE DAILY X 1 MONTH)    Negatives:  Signs/symptoms of thrombosis, Signs/symptoms of bleeding, Laboratory test error suspected, Change in health, Change in alcohol use, Change in activity, Emergency department visit, Upcoming dental procedure, Missed doses, Extra doses, Change in diet/appetite, Hospital admission, Bruising, Other complaints         Patient  reports that he has never smoked. He has never used smokeless tobacco.     Assessment/Plan:  Warfarin indication: mechanical AVR      INR today is SUPRAtherapeutic at 4.4, goal is 2.0-3.0, CAUSE UNKNOWN    Warfarin Dose: HOLD X 2 DAYS AND TAKE 5 MG TOMORROW THEN CONTINUE SAME SCHEDULE OF 10 MG DAILY EXCEPT 5 MG M/F. PATIENT WILL EAT VITAMIN K TODAY/TOMORROW. Follow Up: 2-3 weeks (PATIENT CAN ONLY COME ON THURSDAYS @ 4PM)    Patient understands dosing directions and information discussed. Dosing schedule and follow up appointment given to patient. Patient acknowledges working in consult agreement with pharmacist as referred by his/her physician.     Teresa Malin, [facility] PharmD 7/22/2021 4:05 PM     For Pharmacy Admin Tracking Only     Intervention Detail: Dose Adjustment: 1, reason: Therapy Optimization   Total # of Interventions Recommended: 1   Total # of Interventions Accepted: 1   Time Spent (min): 15

## 2021-08-12 ENCOUNTER — HOSPITAL ENCOUNTER (OUTPATIENT)
Dept: PHARMACY | Age: 71
Setting detail: THERAPIES SERIES
Discharge: HOME | End: 2021-08-12
Payer: MEDICARE

## 2021-08-12 VITALS
DIASTOLIC BLOOD PRESSURE: 79 MMHG | BODY MASS INDEX: 31.87 KG/M2 | SYSTOLIC BLOOD PRESSURE: 135 MMHG | HEART RATE: 58 BPM | WEIGHT: 248.2 LBS

## 2021-08-12 DIAGNOSIS — Z95.2 HISTORY OF MECHANICAL AORTIC VALVE REPLACEMENT: Primary | ICD-10-CM

## 2021-08-12 LAB — INTERNATIONAL NORMALIZATION RATIO, POC: 1.8

## 2021-08-12 PROCEDURE — 85610 PROTHROMBIN TIME: CPT

## 2021-08-12 PROCEDURE — 99212 OFFICE O/P EST SF 10 MIN: CPT

## 2021-08-12 NOTE — PROGRESS NOTES
[address]    Patient Findings     Positives:  Upcoming invasive procedure (WILL HAVE TKR X 2 AND CORNEA REPLACEMENT X 2 IN 2022), Change in medications (HE WILL BE ON KELFEX FOR ABOUT ONE MORE WEEK), Change in diet/appetite (INCREASED VITAMIN K IN HIS DIET)    Negatives:  Signs/symptoms of thrombosis, Signs/symptoms of bleeding, Laboratory test error suspected, Change in health, Change in alcohol use, Change in activity, Emergency department visit, Upcoming dental procedure, Missed doses, Extra doses, Hospital admission, Bruising, Other complaints         Patient  reports that he has never smoked. He has never used smokeless tobacco.     Assessment/Plan:  Warfarin indication: mechanical AVR      INR today is SUBtherapeutic at 1.8, goal is 2-3. LAST INR WAS 4.4 ON SAME REGIMEN. PATIENT ADMITS TO MORE VITAMIN K IN HIS DIET. Warfarin Dose: 15 MG BOOSTER DOSE TODAY THEN RESUME SAME REGIMEN OF 5 MG EVERY MON./FRI.; 10 MG ALL OTHER DAYS    Follow Up: 4 weeks    Patient understands dosing directions and information discussed. Dosing schedule and follow up appointment given to patient. Patient acknowledges working in consult agreement with pharmacist as referred by his/her physician.     Delia Hunter, [facility] PharmD 8/12/2021 4:03 PM    For Pharmacy Admin Tracking Only     Intervention Detail: Dose Adjustment: 1, reason: Therapy Optimization   Total # of Interventions Recommended: 1   Total # of Interventions Accepted: 1   Time Spent (min): 15

## 2021-09-09 ENCOUNTER — HOSPITAL ENCOUNTER (OUTPATIENT)
Dept: PHARMACY | Age: 71
Setting detail: THERAPIES SERIES
Discharge: HOME | End: 2021-09-09
Payer: MEDICARE

## 2021-09-09 VITALS
SYSTOLIC BLOOD PRESSURE: 134 MMHG | WEIGHT: 239.6 LBS | HEART RATE: 61 BPM | BODY MASS INDEX: 30.76 KG/M2 | DIASTOLIC BLOOD PRESSURE: 76 MMHG

## 2021-09-09 DIAGNOSIS — Z95.2 HISTORY OF MECHANICAL AORTIC VALVE REPLACEMENT: Primary | ICD-10-CM

## 2021-09-09 LAB — INTERNATIONAL NORMALIZATION RATIO, POC: 3.2

## 2021-09-09 PROCEDURE — 99212 OFFICE O/P EST SF 10 MIN: CPT

## 2021-09-09 PROCEDURE — 85610 PROTHROMBIN TIME: CPT

## 2021-09-09 NOTE — PROGRESS NOTES
[facility] - FACE-TO-FACE VISIT    Patient Findings     Positives:  Upcoming invasive procedure (HERNIA SURGERY 9/17 @ [address] (WILL HOLD WARFARIN 3-5 DAYS, DR. Amber Day TO DECIDE); WILL HAVE TKR X 2 AND CORNEA REPLACEMENT X 2 IN 2022), Change in diet/appetite (EATING LESS TO LOSE WEIGHT)    Negatives:  Signs/symptoms of thrombosis, Signs/symptoms of bleeding, Laboratory test error suspected, Change in health, Change in alcohol use, Change in activity, Emergency department visit, Upcoming dental procedure, Missed doses, Extra doses, Change in medications, Hospital admission, Bruising, Other complaints         Patient  reports that he has never smoked. He has never used smokeless tobacco.     Assessment/Plan:  Warfarin indication: mechanical AVR      INR today is SUPRAtherapeutic at 3.2, goal is 2-3. LAST INR WAS 1.8; NO REASON FOR EITHER FLUCTUATION IN INR. Warfarin Dose: WILL CUT TODAY'S DOSE IN HALF THEN TAKE 5 MG EVERY MON./FRI.; 10 MG ALL OTHER DAYS    Follow Up: 4 weeks    Patient understands dosing directions and information discussed. Dosing schedule and follow up appointment given to patient. Patient acknowledges working in consult agreement with pharmacist as referred by his/her physician.     Elaine Sunshine, [address] [place] [address] PharmD 9/9/2021 4:18 PM    For Pharmacy Admin Tracking Only     Intervention Detail: Dose Adjustment: 1, reason: Therapy Optimization   Total # of Interventions Recommended: 1   Total # of Interventions Accepted: 1   Time Spent (min): 15

## 2021-10-07 ENCOUNTER — HOSPITAL ENCOUNTER (OUTPATIENT)
Dept: PHARMACY | Age: 71
Setting detail: THERAPIES SERIES
Discharge: HOME | End: 2021-10-07
Payer: MEDICARE

## 2021-10-07 VITALS
SYSTOLIC BLOOD PRESSURE: 137 MMHG | HEART RATE: 60 BPM | BODY MASS INDEX: 29.94 KG/M2 | WEIGHT: 233.2 LBS | DIASTOLIC BLOOD PRESSURE: 78 MMHG

## 2021-10-07 DIAGNOSIS — Z95.2 HISTORY OF MECHANICAL AORTIC VALVE REPLACEMENT: Primary | ICD-10-CM

## 2021-10-07 LAB — INTERNATIONAL NORMALIZATION RATIO, POC: 3.4

## 2021-10-07 PROCEDURE — 85610 PROTHROMBIN TIME: CPT

## 2021-10-07 PROCEDURE — 99212 OFFICE O/P EST SF 10 MIN: CPT

## 2021-10-07 NOTE — PROGRESS NOTES
[address] [facility] FACE-TO-FACE VISIT    Patient Findings     Positives:  Upcoming invasive procedure (WILL HAVE TKR X 2 AND CORNEA REPLACEMENT X 2 IN 2022), Missed doses (HELD WARFARIN 3 DAYS PRIOR TO HERNIA SURGERY ON 9/17)    Negatives:  Signs/symptoms of thrombosis, Signs/symptoms of bleeding, Laboratory test error suspected, Change in health, Change in alcohol use, Change in activity, Emergency department visit, Upcoming dental procedure, Extra doses, Change in medications, Change in diet/appetite, Hospital admission, Bruising, Other complaints         Patient  reports that he has never smoked. He has never used smokeless tobacco.     Assessment/Plan:  Warfarin indication: mechanical AVR      INR today is SUPRAtherapeutic at 3.4, goal is 2-3. LAST INR WAS 3.2. Warfarin Dose: DECREASE WEEKLY DOSE 8% TO 5 MG EVERY M/W/F; 10 MG ALL OTHER DAYS    Follow Up: 4 weeks    Patient understands dosing directions and information discussed. Dosing schedule and follow up appointment given to patient. Patient acknowledges working in consult agreement with pharmacist as referred by his/her physician.     Lisa Richardson, [facility] PharmD 10/7/2021 4:11 PM    For Pharmacy Admin Tracking Only     Intervention Detail: Dose Adjustment: 1, reason: Therapy Optimization   Total # of Interventions Recommended: 1   Total # of Interventions Accepted: 1   Time Spent (min): 15

## 2021-11-04 ENCOUNTER — HOSPITAL ENCOUNTER (OUTPATIENT)
Dept: PHARMACY | Age: 71
Setting detail: THERAPIES SERIES
Discharge: HOME | End: 2021-11-04
Payer: MEDICARE

## 2021-11-04 VITALS
DIASTOLIC BLOOD PRESSURE: 84 MMHG | HEART RATE: 60 BPM | WEIGHT: 238.8 LBS | BODY MASS INDEX: 30.66 KG/M2 | SYSTOLIC BLOOD PRESSURE: 138 MMHG

## 2021-11-04 DIAGNOSIS — Z95.2 HISTORY OF MECHANICAL AORTIC VALVE REPLACEMENT: Primary | ICD-10-CM

## 2021-11-04 LAB — INTERNATIONAL NORMALIZATION RATIO, POC: 3.5

## 2021-11-04 PROCEDURE — 99211 OFF/OP EST MAY X REQ PHY/QHP: CPT

## 2021-11-04 PROCEDURE — 85610 PROTHROMBIN TIME: CPT

## 2021-11-04 NOTE — PROGRESS NOTES
[address]    Patient Findings     Positives:  Upcoming invasive procedure (HE WILL NEED LENS REPLACEMENT, LIKELY DEC.; TKR TO BE DONE AFTER THAT)    Negatives:  Signs/symptoms of thrombosis, Signs/symptoms of bleeding, Laboratory test error suspected, Change in health, Change in alcohol use, Change in activity, Emergency department visit, Upcoming dental procedure, Missed doses, Extra doses, Change in medications, Change in diet/appetite, Hospital admission, Bruising, Other complaints    Comments:  DR. Ze Gamboa THE END OF DEC. Patient  reports that he has never smoked. He has never used smokeless tobacco.     Assessment/Plan:  Warfarin indication: mechanical AVR      INR today is SUPRAtherapeutic at 3.5, goal is 2-3. NO REASON FOR HIGH INR. Warfarin Dose: CONTINUE SAME DOSE OF 5 MG EVERY M/W/F; 10 MG ALL OTHER DAYS; HE WILL ADD MORE SALADS INTO HIS DIET ON A REGULAR BASIS    Follow Up: 4 weeks - PATIENT REFUSING TO COME SOONER    Patient understands dosing directions and information discussed. Dosing schedule and follow up appointment given to patient. Patient acknowledges working in consult agreement with pharmacist as referred by his/her physician.     Alia Bhatt [facility] PharmD 11/4/2021 4:07 PM    For Pharmacy Admin Tracking Only     Intervention Detail: Dose Adjustment: 1, reason: Therapy Optimization   Total # of Interventions Recommended: 1   Total # of Interventions Accepted: 1   Time Spent (min): 15

## 2021-12-08 ENCOUNTER — HOSPITAL ENCOUNTER (OUTPATIENT)
Dept: PHARMACY | Age: 71
Setting detail: THERAPIES SERIES
Discharge: HOME | End: 2021-12-08
Payer: MEDICARE

## 2021-12-08 VITALS
DIASTOLIC BLOOD PRESSURE: 76 MMHG | SYSTOLIC BLOOD PRESSURE: 128 MMHG | HEART RATE: 72 BPM | WEIGHT: 239.6 LBS | BODY MASS INDEX: 30.76 KG/M2

## 2021-12-08 DIAGNOSIS — Z95.2 HISTORY OF MECHANICAL AORTIC VALVE REPLACEMENT: Primary | ICD-10-CM

## 2021-12-08 LAB — INTERNATIONAL NORMALIZATION RATIO, POC: 2.4

## 2021-12-08 PROCEDURE — 85610 PROTHROMBIN TIME: CPT

## 2021-12-08 PROCEDURE — 99211 OFF/OP EST MAY X REQ PHY/QHP: CPT

## 2021-12-08 NOTE — PROGRESS NOTES
[facility] - FACE-TO-FACE VISIT    Patient Findings     Positives:  Upcoming invasive procedure (LENS REPLACEMENT AND TKR DATES TBD AS [address] CANCELLED ALL ELECTIVE PROCEDURES D/T COVID), Change in diet/appetite (EATING ABOUT 3 SALADS PER WEEK)    Negatives:  Signs/symptoms of thrombosis, Signs/symptoms of bleeding, Laboratory test error suspected, Change in health, Change in alcohol use, Change in activity, Emergency department visit, Upcoming dental procedure, Missed doses, Extra doses, Change in medications, Hospital admission, Bruising, Other complaints    Comments:  DR. Apollo Herrera LATER IN DEC. Patient  reports that he has never smoked. He has never used smokeless tobacco.     Assessment/Plan:  Warfarin indication: mechanical AVR      INR today is therapeutic at 2.4, goal is 2-3. Warfarin Dose: same (5 mg every M/W/F; 10 mg all other days)    Follow Up: 4 weeks    Patient understands dosing directions and information discussed. Dosing schedule and follow up appointment given to patient. Patient acknowledges working in consult agreement with pharmacist as referred by his/her physician.     Jennifer Primrose, [address] [place] [address] PharmD 12/8/2021 4:20 PM    For Pharmacy Admin Tracking Only     Intervention Detail:    Total # of Interventions Recommended: 0   Total # of Interventions Accepted: 0   Time Spent (min): 15

## 2022-01-13 ENCOUNTER — HOSPITAL ENCOUNTER (OUTPATIENT)
Dept: PHARMACY | Age: 72
Setting detail: THERAPIES SERIES
Discharge: HOME | End: 2022-01-13
Payer: MEDICARE

## 2022-01-13 VITALS
DIASTOLIC BLOOD PRESSURE: 79 MMHG | BODY MASS INDEX: 31.35 KG/M2 | SYSTOLIC BLOOD PRESSURE: 151 MMHG | WEIGHT: 244.2 LBS | HEART RATE: 64 BPM

## 2022-01-13 DIAGNOSIS — Z95.2 HISTORY OF MECHANICAL AORTIC VALVE REPLACEMENT: Primary | ICD-10-CM

## 2022-01-13 LAB — INTERNATIONAL NORMALIZATION RATIO, POC: 2.7

## 2022-01-13 PROCEDURE — 85610 PROTHROMBIN TIME: CPT

## 2022-01-13 PROCEDURE — 99211 OFF/OP EST MAY X REQ PHY/QHP: CPT

## 2022-01-13 RX ORDER — METFORMIN HYDROCHLORIDE EXTENDED-RELEASE TABLETS 1000 MG/1
2000 TABLET, FILM COATED, EXTENDED RELEASE ORAL
COMMUNITY

## 2022-02-10 ENCOUNTER — HOSPITAL ENCOUNTER (OUTPATIENT)
Dept: PHARMACY | Age: 72
Setting detail: THERAPIES SERIES
Discharge: HOME | End: 2022-02-10
Payer: MEDICARE

## 2022-02-10 VITALS
SYSTOLIC BLOOD PRESSURE: 118 MMHG | WEIGHT: 249.8 LBS | BODY MASS INDEX: 32.07 KG/M2 | DIASTOLIC BLOOD PRESSURE: 63 MMHG | HEART RATE: 65 BPM

## 2022-02-10 DIAGNOSIS — Z95.2 HISTORY OF MECHANICAL AORTIC VALVE REPLACEMENT: Primary | ICD-10-CM

## 2022-02-10 LAB — INTERNATIONAL NORMALIZATION RATIO, POC: 1.6

## 2022-02-10 PROCEDURE — 99212 OFFICE O/P EST SF 10 MIN: CPT

## 2022-02-10 PROCEDURE — 85610 PROTHROMBIN TIME: CPT

## 2022-02-10 NOTE — PROGRESS NOTES
[facility]ticoagulation Clinic - FACE-TO-FACE VISIT    Patient Findings     Positives:  Upcoming invasive procedure (Emiliano Yaness - VINAY @ [facility] (DR. KOHLI))    Negatives:  Signs/symptoms of thrombosis, Signs/symptoms of bleeding, Laboratory test error suspected, Change in health, Change in alcohol use, Change in activity, Emergency department visit, Upcoming dental procedure, Missed doses, Extra doses, Change in medications, Change in diet/appetite, Hospital admission, Bruising, Other complaints    Comments:  PCP NEXT MONTH         Patient  reports that he has never smoked. He has never used smokeless tobacco.     Assessment/Plan:  Warfarin indication: mechanical AVR      INR today is SUBtherapeutic at 1.6, goal is 2-3. NO KNOWN REASON FOR LOW INR. Warfarin Dose: 15 MG BOOSTER DOSE TODAY, THEN RESUME 5 MG EVERY M/W/F; 10 MG ALL OTHER DAYS    Follow Up: 2 WEEKS RECOMMENDED, PATIENT REFUSING TO COME UNLESS IT IS A 4:00 PM APPT. ON A Thursday WHICH I DIDN'T HAVE UNTIL MARCH 10TH. Patient understands dosing directions and information discussed. Dosing schedule and follow up appointment given to patient. Patient acknowledges working in consult agreement with pharmacist as referred by his/her physician.     Randy Duffy, [facility] [place] PharmD 2/10/2022 4:21 PM    For Pharmacy Admin Tracking Only     Intervention Detail: Dose Adjustment: 1, reason: Therapy Optimization   Total # of Interventions Recommended: 1   Total # of Interventions Accepted: 1   Time Spent (min): 15 Please call patient to schedule, he is due for 6 month follow up visit.    Will send refills once appointment is scheduled.

## 2022-03-17 ENCOUNTER — HOSPITAL ENCOUNTER (OUTPATIENT)
Dept: PHARMACY | Age: 72
Setting detail: THERAPIES SERIES
Discharge: HOME | End: 2022-03-17
Payer: MEDICARE

## 2022-03-17 VITALS
HEART RATE: 65 BPM | SYSTOLIC BLOOD PRESSURE: 132 MMHG | WEIGHT: 251.2 LBS | BODY MASS INDEX: 32.25 KG/M2 | DIASTOLIC BLOOD PRESSURE: 76 MMHG

## 2022-03-17 DIAGNOSIS — Z95.2 HISTORY OF MECHANICAL AORTIC VALVE REPLACEMENT: Primary | ICD-10-CM

## 2022-03-17 LAB — INTERNATIONAL NORMALIZATION RATIO, POC: 2.2

## 2022-03-17 PROCEDURE — 99211 OFF/OP EST MAY X REQ PHY/QHP: CPT

## 2022-03-17 PROCEDURE — 85610 PROTHROMBIN TIME: CPT

## 2022-03-17 NOTE — PROGRESS NOTES
[address] [facility] Clinic - FACE-TO-FACE VISIT    Patient Findings     Positives:  Upcoming invasive procedure (Oriana Velasco - TKR @ [facility]) (DR. KOHLI) - PATIENT STATES HE WILL BE OFF WARFARIN 5 DAYS PRIOR TO SURGERY)    Negatives:  Signs/symptoms of thrombosis, Signs/symptoms of bleeding, Laboratory test error suspected, Change in health, Change in alcohol use, Change in activity, Emergency department visit, Upcoming dental procedure, Missed doses, Extra doses, Change in medications, Change in diet/appetite, Hospital admission, Bruising, Other complaints         Patient  reports that he has never smoked. He has never used smokeless tobacco.     Assessment/Plan:  Warfarin indication: mechanical AVR      INR today is therapeutic at 2.2, goal is 2-3.     Warfarin Dose: same (5 mg every M/W/F; 10 mg all other days)    Follow Up: TBD - PATIENT HAVING TKR @ [facility] 4/15, WILL CALL PATIENT WEEK OF 4/18 TO SEE IF HE HAS HHC WE AND CAN ORDER INR TO BE DRAWN WITH C       Blayne Roa, [facility] PharmD 3/17/2022 4:10 PM    For Pharmacy Admin Tracking Only     Intervention Detail:    Total # of Interventions Recommended: 0   Total # of Interventions Accepted: 0   Time Spent (min): 15

## 2022-05-11 ENCOUNTER — TELEPHONE (OUTPATIENT)
Dept: PHARMACY | Age: 72
End: 2022-05-11

## 2022-05-11 NOTE — TELEPHONE ENCOUNTER
Called and spoke with patient to follow up - he was supposed to have a TKR @ [facility] April 15th. [facility], patient stated the procedure was cancelled and has not yet been rescheduled. He stated he never held warfarin prior to what would have been his surgery. Patient has not had any INR checks since his last visit in office on 3/17/22. Scheduled an appt for as soon as possible, according to his schedule.  He is only available Thursdays at 3:45 pm or 4 pm. Scheduled next appt for Thursday 5/26 @ 3:45 pm.    Electronically signed by Christina Conley on 5/11/22 at 11:57 AM EDT

## 2022-05-26 ENCOUNTER — HOSPITAL ENCOUNTER (OUTPATIENT)
Dept: PHARMACY | Age: 72
Setting detail: THERAPIES SERIES
Discharge: HOME | End: 2022-05-26
Payer: MEDICARE

## 2022-05-26 VITALS
DIASTOLIC BLOOD PRESSURE: 76 MMHG | HEART RATE: 69 BPM | BODY MASS INDEX: 31.51 KG/M2 | SYSTOLIC BLOOD PRESSURE: 138 MMHG | WEIGHT: 245.4 LBS

## 2022-05-26 DIAGNOSIS — Z95.2 HISTORY OF MECHANICAL AORTIC VALVE REPLACEMENT: Primary | ICD-10-CM

## 2022-05-26 LAB — INTERNATIONAL NORMALIZATION RATIO, POC: 2.7

## 2022-05-26 PROCEDURE — 85610 PROTHROMBIN TIME: CPT

## 2022-05-26 PROCEDURE — 99211 OFF/OP EST MAY X REQ PHY/QHP: CPT

## 2022-05-26 NOTE — PROGRESS NOTES
[address] [facility]    Patient Findings     Positives:  Upcoming invasive procedure (CATARACTS IN AUG/SEPT, TKR CANCELLED - MAY BE RESCHEDULED FOR SEPT?)    Negatives:  Signs/symptoms of thrombosis, Signs/symptoms of bleeding, Laboratory test error suspected, Change in health, Change in alcohol use, Change in activity, Emergency department visit, Upcoming dental procedure, Missed doses, Extra doses, Change in medications, Change in diet/appetite, Hospital admission, Bruising, Other complaints         Patient  reports that he has never smoked.  He has never used smokeless tobacco.     Assessment/Plan:  Warfarin indication: mechanical AVR      INR today is therapeutic at 2.7, goal is 2-3    Warfarin Dose: same (5 mg every M//; 10 mg all other days)    Follow Up: 4 weeks      Nikkie Aguila, [address] [place] [address] PharmD 2022 4:11 PM    For Pharmacy Admin Tracking Only     Intervention Detail: Adherence Monitorin   Total # of Interventions Recommended:    Total # of Interventions Accepted:    Time Spent (min): 15

## 2022-07-07 ENCOUNTER — HOSPITAL ENCOUNTER (OUTPATIENT)
Dept: PHARMACY | Age: 72
Setting detail: THERAPIES SERIES
Discharge: HOME | End: 2022-07-07
Payer: MEDICARE

## 2022-07-07 VITALS
HEART RATE: 68 BPM | BODY MASS INDEX: 31.1 KG/M2 | DIASTOLIC BLOOD PRESSURE: 80 MMHG | SYSTOLIC BLOOD PRESSURE: 128 MMHG | WEIGHT: 242.2 LBS

## 2022-07-07 DIAGNOSIS — Z95.2 HISTORY OF MECHANICAL AORTIC VALVE REPLACEMENT: Primary | ICD-10-CM

## 2022-07-07 LAB — INTERNATIONAL NORMALIZATION RATIO, POC: 1.8

## 2022-07-07 PROCEDURE — 99212 OFFICE O/P EST SF 10 MIN: CPT

## 2022-07-07 PROCEDURE — 85610 PROTHROMBIN TIME: CPT

## 2022-07-07 NOTE — PROGRESS NOTES
[address]    Patient Findings     Positives:  Upcoming invasive procedure (RIGHT EYE CATARACT SURGERY TOMORROW), Change in medications (EYE DROPS FOR CATARACT SURGERY)    Negatives:  Signs/symptoms of thrombosis, Signs/symptoms of bleeding, Laboratory test error suspected, Change in health, Change in alcohol use, Change in activity, Emergency department visit, Upcoming dental procedure, Missed doses, Extra doses, Change in diet/appetite, Hospital admission, Bruising, Other complaints         Patient  reports that he has never smoked. He has never used smokeless tobacco.     Assessment/Plan:  Warfarin indication: mechanical AVR      INR today is SUBtherapeutic at 1.8, goal is 2-3. NO REASON FOR LOW INR.     Warfarin Dose: 10 MG TODAY THEN RESUME SAME SCHEDULE (5 MG EVERY M/W/F; 10 MG ALL OTHER DAYS)    Follow Up: 4 weeks (D/T VACATION)      Kaur Shields [facility] PharmD 7/7/2022 4:12 PM    For Pharmacy Admin Tracking Only     Intervention Detail: Dose Adjustment: 1, reason: Therapy Optimization   Total # of Interventions Recommended: 1   Total # of Interventions Accepted: 1   Time Spent (min): 15

## 2022-08-11 ENCOUNTER — HOSPITAL ENCOUNTER (OUTPATIENT)
Dept: PHARMACY | Age: 72
Setting detail: THERAPIES SERIES
Discharge: HOME | End: 2022-08-11
Payer: MEDICARE

## 2022-08-11 VITALS
BODY MASS INDEX: 31.02 KG/M2 | HEART RATE: 67 BPM | DIASTOLIC BLOOD PRESSURE: 77 MMHG | SYSTOLIC BLOOD PRESSURE: 120 MMHG | WEIGHT: 241.6 LBS

## 2022-08-11 DIAGNOSIS — Z95.2 HISTORY OF MECHANICAL AORTIC VALVE REPLACEMENT: Primary | ICD-10-CM

## 2022-08-11 LAB — INTERNATIONAL NORMALIZATION RATIO, POC: 2

## 2022-08-11 PROCEDURE — 85610 PROTHROMBIN TIME: CPT

## 2022-08-11 PROCEDURE — 99211 OFF/OP EST MAY X REQ PHY/QHP: CPT

## 2022-08-11 NOTE — PROGRESS NOTES
[address] [facility] [address]    Patient Findings       Positives:  Upcoming invasive procedure (KNEE SURGERY IN SEPT)    Negatives:  Signs/symptoms of thrombosis, Signs/symptoms of bleeding, Laboratory test error suspected, Change in health, Change in alcohol use, Change in activity, Emergency department visit, Upcoming dental procedure, Missed doses, Extra doses, Change in medications, Change in diet/appetite, Hospital admission, Bruising, Other complaints           Patient  reports that he has never smoked.  He has never used smokeless tobacco.     Assessment/Plan:  Warfarin indication: mechanical AVR        INR today is therapeutic at 2, goal is 2-3    Warfarin Dose: Same (5mg MWF/ 10mg all other days)    Follow Up: 5 weeks (D/T PATIENT AVAILABILITY)      Bam Cheema, [address] [facility] PharmD 2022 4:15 PM    For Pharmacy Admin Tracking Only    Intervention Detail: Adherence Monitorin  Total # of Interventions Recommended: 0  Total # of Interventions Accepted: 0  Time Spent (min): 15

## 2022-09-02 ENCOUNTER — TELEPHONE (OUTPATIENT)
Dept: PHARMACY | Age: 72
End: 2022-09-02

## 2022-09-02 DIAGNOSIS — Z95.2 HISTORY OF MECHANICAL AORTIC VALVE REPLACEMENT: Primary | ICD-10-CM

## 2022-09-02 NOTE — TELEPHONE ENCOUNTER
During reminder call for appt scheduled Wednesday 9/7 @ 3:15 pm, patient stated he would no longer need to go to the [facility]. He will go to [facility] because it is closer to him. Ok to cancel appt and discharge patient.     Electronically signed by Felipa Alvarado on 9/2/22 at 2:49 PM EDT

## 2022-12-29 LAB
AVERAGE GLUCOSE: NORMAL
HBA1C MFR BLD: 6.9 %

## 2024-09-24 ENCOUNTER — OFFICE VISIT (OUTPATIENT)
Dept: VASCULAR SURGERY | Age: 74
End: 2024-09-24
Payer: MEDICARE

## 2024-09-24 ENCOUNTER — TELEPHONE (OUTPATIENT)
Dept: VASCULAR SURGERY | Age: 74
End: 2024-09-24

## 2024-09-24 VITALS — SYSTOLIC BLOOD PRESSURE: 138 MMHG | DIASTOLIC BLOOD PRESSURE: 54 MMHG

## 2024-09-24 DIAGNOSIS — I65.21 CAROTID STENOSIS, ASYMPTOMATIC, RIGHT: Primary | ICD-10-CM

## 2024-09-24 DIAGNOSIS — Z95.2 HISTORY OF MECHANICAL AORTIC VALVE REPLACEMENT: ICD-10-CM

## 2024-09-24 DIAGNOSIS — Z01.818 PRE-OP EVALUATION: ICD-10-CM

## 2024-09-24 PROCEDURE — 1123F ACP DISCUSS/DSCN MKR DOCD: CPT | Performed by: SURGERY

## 2024-09-24 PROCEDURE — 99204 OFFICE O/P NEW MOD 45 MIN: CPT | Performed by: SURGERY

## 2024-10-03 ENCOUNTER — TELEPHONE (OUTPATIENT)
Dept: VASCULAR SURGERY | Age: 74
End: 2024-10-03

## 2024-10-03 ENCOUNTER — PREP FOR PROCEDURE (OUTPATIENT)
Dept: VASCULAR SURGERY | Age: 74
End: 2024-10-03

## 2024-10-03 DIAGNOSIS — I65.21 CAROTID STENOSIS, RIGHT: ICD-10-CM

## 2024-10-03 RX ORDER — LOSARTAN POTASSIUM 25 MG/1
75 TABLET ORAL NIGHTLY
COMMUNITY

## 2024-10-03 RX ORDER — FERROUS SULFATE 325(65) MG
325 TABLET ORAL EVERY OTHER DAY
COMMUNITY

## 2024-10-03 RX ORDER — UBIDECARENONE 50 MG
50 CAPSULE ORAL DAILY
COMMUNITY

## 2024-10-03 RX ORDER — METOPROLOL SUCCINATE 50 MG/1
25 TABLET, EXTENDED RELEASE ORAL NIGHTLY
COMMUNITY

## 2024-10-03 RX ORDER — NICOTINE POLACRILEX 2 MG
1 GUM BUCCAL DAILY
COMMUNITY

## 2024-10-03 RX ORDER — INSULIN GLARGINE 300 U/ML
60 INJECTION, SOLUTION SUBCUTANEOUS NIGHTLY
COMMUNITY

## 2024-10-03 RX ORDER — ASCORBIC ACID 500 MG
500 TABLET ORAL DAILY
COMMUNITY

## 2024-10-03 RX ORDER — ROSUVASTATIN CALCIUM 40 MG/1
40 TABLET, COATED ORAL EVERY EVENING
COMMUNITY

## 2024-10-03 NOTE — TELEPHONE ENCOUNTER
Received cardiac clearance from Dr. Green. Spoke with patient, scheduled right carotid endarterectomy on 10-18-24.      Patient does have a couple of questions regarding the surgery, was reluctant to schedule appt and he is asking if he can discuss over the phone.

## 2024-10-04 NOTE — PROGRESS NOTES
[facility]   PRE-ADMISSION TESTING GENERAL INSTRUCTIONS  PAT Phone Number: [phone]      GENERAL INSTRUCTIONS:    [x] Antibacterial Soap Shower Night before AND the Morning of procedure.  [x] Do not wear lotions, powders, deodorant the morning of surgery.  [x] No solid food after midnight. You may have SIPS of clear liquids up until 2 hours before your arrival time to the hospital. - unless your surgery time changes, you may have sips of clear liquids up till 0600.  [x] You may brush your teeth, gargle, but do not swallow water.   [x] No tobacco products, illegal drugs, or alcohol within 24 hours of your surgery.  [x] Jewelry or valuables should not be brought to the hospital. All body and/or tongue piercing's must be removed prior to arriving to hospital. No contact lens or hair pins.   [x] Arrange transportation with a responsible adult  to and from the hospital.   [x] Bring insurance card and photo ID.  [] Bring copy of living will or healthcare power of  papers to be placed in your electronic record.  [x] Transfusion (Green) Bracelet: Please bring with you to hospital, day of surgery.     PARKING INSTRUCTIONS:     [x] ARRIVAL DATE & TIME: FRIDAY, 10/18, AT 8 AM.  [x] Times are subject to change. We will contact you the business day before surgery if that were to occur.  [x] Enter into the [facility] Entrance. Two people may accompany you. Masks are not required.  [x] Parking Lot \"I\" is where you will park. It is located on the corner of [address] and [address]. The entrance is on [address].   Only one vehicle - per patient, is permitted in parking lot.   Upon entering the parking lot, a voucher ticket will print.    EDUCATION INSTRUCTIONS:           [x] Pre-admission Testing educational folder given.  [x] Incentive Spirometry,coughing & deep breathing exercises reviewed.  [x] Fluoroscopy-Xray used in surgery reviewed with patient. Educational

## 2024-10-10 ENCOUNTER — HOSPITAL ENCOUNTER (OUTPATIENT)
Dept: PREADMISSION TESTING | Age: 74
Discharge: HOME | End: 2024-10-10
Payer: MEDICARE

## 2024-10-10 VITALS
TEMPERATURE: 98.2 F | OXYGEN SATURATION: 97 % | WEIGHT: 236.4 LBS | HEIGHT: 74 IN | RESPIRATION RATE: 16 BRPM | HEART RATE: 54 BPM | DIASTOLIC BLOOD PRESSURE: 68 MMHG | BODY MASS INDEX: 30.34 KG/M2 | SYSTOLIC BLOOD PRESSURE: 156 MMHG

## 2024-10-10 DIAGNOSIS — I65.21 CAROTID STENOSIS, RIGHT: ICD-10-CM

## 2024-10-10 LAB
ABO + RH BLD: NORMAL
ANION GAP SERPL CALCULATED.3IONS-SCNC: 10 MMOL/L (ref 7–16)
ARM BAND NUMBER: NORMAL
BLOOD BANK SAMPLE EXPIRATION: NORMAL
BLOOD GROUP ANTIBODIES SERPL: NEGATIVE
BUN SERPL-MCNC: 26 MG/DL (ref 6–23)
CALCIUM SERPL-MCNC: 8.8 MG/DL (ref 8.6–10.2)
CHLORIDE SERPL-SCNC: 102 MMOL/L (ref 98–107)
CO2 SERPL-SCNC: 22 MMOL/L (ref 22–29)
CREAT SERPL-MCNC: 1 MG/DL (ref 0.7–1.2)
ERYTHROCYTE [DISTWIDTH] IN BLOOD BY AUTOMATED COUNT: 12.7 % (ref 11.5–15)
GFR, ESTIMATED: 76 ML/MIN/1.73M2
GLUCOSE SERPL-MCNC: 235 MG/DL (ref 74–99)
HCT VFR BLD AUTO: 42.2 % (ref 37–54)
HGB BLD-MCNC: 13.9 G/DL (ref 12.5–16.5)
INR PPP: 3
MCH RBC QN AUTO: 29.3 PG (ref 26–35)
MCHC RBC AUTO-ENTMCNC: 32.9 G/DL (ref 32–34.5)
MCV RBC AUTO: 88.8 FL (ref 80–99.9)
PARTIAL THROMBOPLASTIN TIME: 36.7 SEC (ref 24.5–35.1)
PLATELET # BLD AUTO: 148 K/UL (ref 130–450)
PMV BLD AUTO: 9.9 FL (ref 7–12)
POTASSIUM SERPL-SCNC: 4 MMOL/L (ref 3.5–5)
PROTHROMBIN TIME: 32.5 SEC (ref 9.3–12.4)
RBC # BLD AUTO: 4.75 M/UL (ref 3.8–5.8)
SODIUM SERPL-SCNC: 134 MMOL/L (ref 132–146)
WBC OTHER # BLD: 6.2 K/UL (ref 4.5–11.5)

## 2024-10-10 PROCEDURE — 80048 BASIC METABOLIC PNL TOTAL CA: CPT

## 2024-10-10 PROCEDURE — 85730 THROMBOPLASTIN TIME PARTIAL: CPT

## 2024-10-10 PROCEDURE — 36415 COLL VENOUS BLD VENIPUNCTURE: CPT

## 2024-10-10 PROCEDURE — 87081 CULTURE SCREEN ONLY: CPT

## 2024-10-10 PROCEDURE — 86901 BLOOD TYPING SEROLOGIC RH(D): CPT

## 2024-10-10 PROCEDURE — 85610 PROTHROMBIN TIME: CPT

## 2024-10-10 PROCEDURE — 86900 BLOOD TYPING SEROLOGIC ABO: CPT

## 2024-10-10 PROCEDURE — 85027 COMPLETE CBC AUTOMATED: CPT

## 2024-10-10 PROCEDURE — 86850 RBC ANTIBODY SCREEN: CPT

## 2024-10-10 NOTE — PROGRESS NOTES
[facility]   PRE-ADMISSION TESTING GENERAL INSTRUCTIONS  PAT Phone Number: [phone]      GENERAL INSTRUCTIONS:    [x] Antibacterial Soap Shower Night before AND the Morning of procedure.  [x] Do not wear lotions, powders, deodorant the morning of surgery.  [x] No solid food after midnight. You may have SIPS of clear liquids up until 2 hours before your arrival time to the hospital. - unless your surgery time changes, you may have sips of clear liquids up till 0600.  [x] You may brush your teeth, gargle, but do not swallow water.   [x] No tobacco products, illegal drugs, or alcohol within 24 hours of your surgery.  [x] Jewelry or valuables should not be brought to the hospital. All body and/or tongue piercing's must be removed prior to arriving to hospital. No contact lens or hair pins.   [x] Arrange transportation with a responsible adult  to and from the hospital.   [x] Bring insurance card and photo ID.    [x] Transfusion (Green) Bracelet: Please bring with you to hospital, day of surgery.     PARKING INSTRUCTIONS:     [x] ARRIVAL DATE & TIME: FRIDAY, 10/18, AT 8 AM.  [x] Times are subject to change. We will contact you the business day before surgery if that were to occur.  [x] Enter into the [facility] Entrance. Two people may accompany you. Masks are not required.  [x] Parking Lot \"I\" is where you will park. It is located on the corner of [address] and [address]. The entrance is on [address].   Only one vehicle - per patient, is permitted in parking lot.   Upon entering the parking lot, a voucher ticket will print.    EDUCATION INSTRUCTIONS:           [x] Pre-admission Testing educational folder given.  [x] Incentive Spirometry,coughing & deep breathing exercises reviewed.  [x] Fluoroscopy-Xray used in surgery reviewed with patient. Educational pamphlet placed in chart.  [x] Pain: Post-op pain is normal and to be expected. You will be asked to rate your

## 2024-10-10 NOTE — PROGRESS NOTES
Spoke with Adriane Moncada regarding instructions regarding Coumadin and Aspirin instructions regarding pre op instructions.  She states follow cardiologist instructions last day for aspirin last day 10/12 and coumadin last day 10/14.

## 2024-10-13 LAB
MICROORGANISM SPEC CULT: NORMAL
SPECIMEN DESCRIPTION: NORMAL

## 2024-10-18 ENCOUNTER — ANESTHESIA EVENT (OUTPATIENT)
Dept: OPERATING ROOM | Age: 74
End: 2024-10-18
Payer: MEDICARE

## 2024-10-18 ENCOUNTER — ANESTHESIA (OUTPATIENT)
Dept: OPERATING ROOM | Age: 74
End: 2024-10-18
Payer: MEDICARE

## 2024-10-18 ENCOUNTER — HOSPITAL ENCOUNTER (INPATIENT)
Age: 74
LOS: 1 days | Discharge: HOME | DRG: 039 | End: 2024-10-19
Attending: SURGERY | Admitting: SURGERY
Payer: MEDICARE

## 2024-10-18 DIAGNOSIS — I65.21 CAROTID STENOSIS, RIGHT: Primary | ICD-10-CM

## 2024-10-18 DIAGNOSIS — Z01.812 PRE-OPERATIVE LABORATORY EXAMINATION: ICD-10-CM

## 2024-10-18 LAB
BUN BLD-MCNC: 21 MG/DL (ref 6–23)
CA-I BLD-SCNC: 1.16 MMOL/L (ref 1.15–1.33)
CHLORIDE BLD-SCNC: 108 MMOL/L (ref 100–108)
CO2 BLD CALC-SCNC: 25 MMOL/L (ref 22–29)
CREAT BLD-MCNC: 0.9 MG/DL (ref 0.7–1.2)
EGFR, POC: 89 ML/MIN/1.73M2
GLUCOSE BLD-MCNC: 163 MG/DL (ref 74–99)
GLUCOSE BLD-MCNC: 172 MG/DL (ref 74–99)
GLUCOSE BLD-MCNC: 189 MG/DL (ref 74–99)
GLUCOSE BLD-MCNC: 228 MG/DL (ref 74–99)
GLUCOSE BLD-MCNC: 258 MG/DL (ref 74–99)
HCT VFR BLD AUTO: 35 % (ref 37–54)
INR PPP: 1.5
NEGATIVE BASE EXCESS, ART: 2.1 MMOL/L
POC ANION GAP: 10 MMOL/L (ref 7–16)
POC HCO3: 24.7 MMOL/L (ref 22–26)
POC HEMOGLOBIN (CALC): 12 G/DL (ref 12.5–15.5)
POC LACTIC ACID: 0.9 MMOL/L (ref 0.5–2.2)
POC O2 SATURATION: 99.8 % (ref 92–98.5)
POC PCO2: 49.8 MM HG (ref 35–45)
POC PH: 7.3 (ref 7.35–7.45)
POC PO2: 268.4 MM HG (ref 60–80)
POTASSIUM BLD-SCNC: 3.5 MMOL/L (ref 3.5–5)
PROTHROMBIN TIME: 16.6 SEC (ref 9.3–12.4)
SODIUM BLD-SCNC: 143 MMOL/L (ref 132–146)

## 2024-10-18 PROCEDURE — A4217 STERILE WATER/SALINE, 500 ML: HCPCS | Performed by: SURGERY

## 2024-10-18 PROCEDURE — C1768 GRAFT, VASCULAR: HCPCS | Performed by: SURGERY

## 2024-10-18 PROCEDURE — 35301 RECHANNELING OF ARTERY: CPT | Performed by: SURGERY

## 2024-10-18 PROCEDURE — 82962 GLUCOSE BLOOD TEST: CPT

## 2024-10-18 PROCEDURE — 2580000003 HC RX 258

## 2024-10-18 PROCEDURE — 2709999900 HC NON-CHARGEABLE SUPPLY: Performed by: SURGERY

## 2024-10-18 PROCEDURE — 3600000016 HC SURGERY LEVEL 6 ADDTL 15MIN: Performed by: SURGERY

## 2024-10-18 PROCEDURE — 3700000001 HC ADD 15 MINUTES (ANESTHESIA): Performed by: SURGERY

## 2024-10-18 PROCEDURE — 6370000000 HC RX 637 (ALT 250 FOR IP): Performed by: SURGERY

## 2024-10-18 PROCEDURE — 6360000002 HC RX W HCPCS: Performed by: SURGERY

## 2024-10-18 PROCEDURE — 7100000001 HC PACU RECOVERY - ADDTL 15 MIN

## 2024-10-18 PROCEDURE — 2580000003 HC RX 258: Performed by: SURGERY

## 2024-10-18 PROCEDURE — 6360000002 HC RX W HCPCS

## 2024-10-18 PROCEDURE — 2500000003 HC RX 250 WO HCPCS

## 2024-10-18 PROCEDURE — 2000000000 HC ICU R&B

## 2024-10-18 PROCEDURE — 85347 COAGULATION TIME ACTIVATED: CPT

## 2024-10-18 PROCEDURE — 3700000000 HC ANESTHESIA ATTENDED CARE: Performed by: SURGERY

## 2024-10-18 PROCEDURE — 6370000000 HC RX 637 (ALT 250 FOR IP): Performed by: NURSE PRACTITIONER

## 2024-10-18 PROCEDURE — 80047 BASIC METABLC PNL IONIZED CA: CPT

## 2024-10-18 PROCEDURE — 7100000000 HC PACU RECOVERY - FIRST 15 MIN

## 2024-10-18 PROCEDURE — 03CH0ZZ EXTIRPATION OF MATTER FROM RIGHT COMMON CAROTID ARTERY, OPEN APPROACH: ICD-10-PCS | Performed by: SURGERY

## 2024-10-18 PROCEDURE — 88304 TISSUE EXAM BY PATHOLOGIST: CPT

## 2024-10-18 PROCEDURE — 83605 ASSAY OF LACTIC ACID: CPT

## 2024-10-18 PROCEDURE — 3600000006 HC SURGERY LEVEL 6 BASE: Performed by: SURGERY

## 2024-10-18 PROCEDURE — 6360000002 HC RX W HCPCS: Performed by: NURSE ANESTHETIST, CERTIFIED REGISTERED

## 2024-10-18 PROCEDURE — 36620 INSERTION CATHETER ARTERY: CPT | Performed by: STUDENT IN AN ORGANIZED HEALTH CARE EDUCATION/TRAINING PROGRAM

## 2024-10-18 PROCEDURE — 2500000003 HC RX 250 WO HCPCS: Performed by: NURSE ANESTHETIST, CERTIFIED REGISTERED

## 2024-10-18 PROCEDURE — 6370000000 HC RX 637 (ALT 250 FOR IP)

## 2024-10-18 PROCEDURE — 85014 HEMATOCRIT: CPT

## 2024-10-18 PROCEDURE — 2500000003 HC RX 250 WO HCPCS: Performed by: SURGERY

## 2024-10-18 PROCEDURE — 82803 BLOOD GASES ANY COMBINATION: CPT

## 2024-10-18 PROCEDURE — 03UH0JZ SUPPLEMENT RIGHT COMMON CAROTID ARTERY WITH SYNTHETIC SUBSTITUTE, OPEN APPROACH: ICD-10-PCS | Performed by: SURGERY

## 2024-10-18 PROCEDURE — 85610 PROTHROMBIN TIME: CPT

## 2024-10-18 DEVICE — XENOSURE BIOLOGIC PATCH, 0.8CM X 8CM, EIFU
Type: IMPLANTABLE DEVICE | Site: CAROTID | Status: FUNCTIONAL
Brand: XENOSURE BIOLOGIC PATCH

## 2024-10-18 RX ORDER — EPHEDRINE SULFATE/0.9% NACL/PF 25 MG/5 ML
SYRINGE (ML) INTRAVENOUS
Status: DISCONTINUED | OUTPATIENT
Start: 2024-10-18 | End: 2024-10-18 | Stop reason: SDUPTHER

## 2024-10-18 RX ORDER — MORPHINE SULFATE 2 MG/ML
1 INJECTION, SOLUTION INTRAMUSCULAR; INTRAVENOUS
Status: DISCONTINUED | OUTPATIENT
Start: 2024-10-18 | End: 2024-10-19

## 2024-10-18 RX ORDER — PHENYLEPHRINE HCL IN 0.9% NACL 1 MG/10 ML
SYRINGE (ML) INTRAVENOUS
Status: DISCONTINUED | OUTPATIENT
Start: 2024-10-18 | End: 2024-10-18 | Stop reason: SDUPTHER

## 2024-10-18 RX ORDER — SODIUM CHLORIDE 0.9 % (FLUSH) 0.9 %
5-40 SYRINGE (ML) INJECTION PRN
Status: DISCONTINUED | OUTPATIENT
Start: 2024-10-18 | End: 2024-10-19 | Stop reason: HOSPADM

## 2024-10-18 RX ORDER — SODIUM CHLORIDE 9 MG/ML
INJECTION, SOLUTION INTRAVENOUS CONTINUOUS
Status: DISCONTINUED | OUTPATIENT
Start: 2024-10-18 | End: 2024-10-19

## 2024-10-18 RX ORDER — SODIUM CHLORIDE 9 MG/ML
INJECTION, SOLUTION INTRAVENOUS PRN
Status: DISCONTINUED | OUTPATIENT
Start: 2024-10-18 | End: 2024-10-19 | Stop reason: HOSPADM

## 2024-10-18 RX ORDER — METOPROLOL SUCCINATE 25 MG/1
25 TABLET, EXTENDED RELEASE ORAL NIGHTLY
Status: DISCONTINUED | OUTPATIENT
Start: 2024-10-18 | End: 2024-10-19 | Stop reason: HOSPADM

## 2024-10-18 RX ORDER — SODIUM CHLORIDE 0.9 % (FLUSH) 0.9 %
5-40 SYRINGE (ML) INJECTION PRN
Status: DISCONTINUED | OUTPATIENT
Start: 2024-10-18 | End: 2024-10-18 | Stop reason: HOSPADM

## 2024-10-18 RX ORDER — SODIUM CHLORIDE 9 MG/ML
INJECTION, SOLUTION INTRAVENOUS PRN
Status: DISCONTINUED | OUTPATIENT
Start: 2024-10-18 | End: 2024-10-18 | Stop reason: HOSPADM

## 2024-10-18 RX ORDER — HYDRALAZINE HYDROCHLORIDE 20 MG/ML
10 INJECTION INTRAMUSCULAR; INTRAVENOUS
Status: DISCONTINUED | OUTPATIENT
Start: 2024-10-18 | End: 2024-10-19

## 2024-10-18 RX ORDER — HEPARIN SODIUM 1000 [USP'U]/ML
INJECTION, SOLUTION INTRAVENOUS; SUBCUTANEOUS
Status: DISCONTINUED | OUTPATIENT
Start: 2024-10-18 | End: 2024-10-18 | Stop reason: SDUPTHER

## 2024-10-18 RX ORDER — GLYCOPYRROLATE 0.2 MG/ML
INJECTION INTRAMUSCULAR; INTRAVENOUS
Status: DISCONTINUED | OUTPATIENT
Start: 2024-10-18 | End: 2024-10-18 | Stop reason: SDUPTHER

## 2024-10-18 RX ORDER — SODIUM CHLORIDE 0.9 % (FLUSH) 0.9 %
5-40 SYRINGE (ML) INJECTION EVERY 12 HOURS SCHEDULED
Status: DISCONTINUED | OUTPATIENT
Start: 2024-10-18 | End: 2024-10-18 | Stop reason: HOSPADM

## 2024-10-18 RX ORDER — ROSUVASTATIN CALCIUM 20 MG/1
40 TABLET, COATED ORAL EVERY EVENING
Status: DISCONTINUED | OUTPATIENT
Start: 2024-10-19 | End: 2024-10-19 | Stop reason: HOSPADM

## 2024-10-18 RX ORDER — LIDOCAINE HYDROCHLORIDE 10 MG/ML
INJECTION, SOLUTION INFILTRATION; PERINEURAL PRN
Status: DISCONTINUED | OUTPATIENT
Start: 2024-10-18 | End: 2024-10-18 | Stop reason: ALTCHOICE

## 2024-10-18 RX ORDER — LIDOCAINE HYDROCHLORIDE 20 MG/ML
INJECTION, SOLUTION INTRAVENOUS
Status: DISCONTINUED | OUTPATIENT
Start: 2024-10-18 | End: 2024-10-18 | Stop reason: SDUPTHER

## 2024-10-18 RX ORDER — SODIUM CHLORIDE 9 MG/ML
INJECTION, SOLUTION INTRAVENOUS CONTINUOUS
Status: DISCONTINUED | OUTPATIENT
Start: 2024-10-18 | End: 2024-10-18 | Stop reason: HOSPADM

## 2024-10-18 RX ORDER — SODIUM CHLORIDE 9 MG/ML
INJECTION, SOLUTION INTRAVENOUS
Status: DISCONTINUED | OUTPATIENT
Start: 2024-10-18 | End: 2024-10-18 | Stop reason: SDUPTHER

## 2024-10-18 RX ORDER — ROCURONIUM BROMIDE 10 MG/ML
INJECTION, SOLUTION INTRAVENOUS
Status: DISCONTINUED | OUTPATIENT
Start: 2024-10-18 | End: 2024-10-18 | Stop reason: SDUPTHER

## 2024-10-18 RX ORDER — ONDANSETRON 2 MG/ML
INJECTION INTRAMUSCULAR; INTRAVENOUS
Status: DISCONTINUED | OUTPATIENT
Start: 2024-10-18 | End: 2024-10-18 | Stop reason: SDUPTHER

## 2024-10-18 RX ORDER — SODIUM CHLORIDE 0.9 % (FLUSH) 0.9 %
5-40 SYRINGE (ML) INJECTION EVERY 12 HOURS SCHEDULED
Status: DISCONTINUED | OUTPATIENT
Start: 2024-10-18 | End: 2024-10-19 | Stop reason: HOSPADM

## 2024-10-18 RX ORDER — PROTAMINE SULFATE 10 MG/ML
INJECTION, SOLUTION INTRAVENOUS
Status: DISCONTINUED | OUTPATIENT
Start: 2024-10-18 | End: 2024-10-18 | Stop reason: SDUPTHER

## 2024-10-18 RX ORDER — OXYCODONE HYDROCHLORIDE 5 MG/1
5 TABLET ORAL
Status: DISCONTINUED | OUTPATIENT
Start: 2024-10-18 | End: 2024-10-19

## 2024-10-18 RX ORDER — INSULIN LISPRO 100 [IU]/ML
0-4 INJECTION, SOLUTION INTRAVENOUS; SUBCUTANEOUS
Status: DISCONTINUED | OUTPATIENT
Start: 2024-10-18 | End: 2024-10-19 | Stop reason: HOSPADM

## 2024-10-18 RX ORDER — ASPIRIN 81 MG/1
81 TABLET, CHEWABLE ORAL DAILY
Status: DISCONTINUED | OUTPATIENT
Start: 2024-10-19 | End: 2024-10-19 | Stop reason: HOSPADM

## 2024-10-18 RX ORDER — MIDAZOLAM HYDROCHLORIDE 1 MG/ML
INJECTION INTRAMUSCULAR; INTRAVENOUS
Status: DISCONTINUED | OUTPATIENT
Start: 2024-10-18 | End: 2024-10-18 | Stop reason: SDUPTHER

## 2024-10-18 RX ORDER — INSULIN LISPRO 100 [IU]/ML
2 INJECTION, SOLUTION INTRAVENOUS; SUBCUTANEOUS ONCE
Status: COMPLETED | OUTPATIENT
Start: 2024-10-18 | End: 2024-10-18

## 2024-10-18 RX ORDER — FENTANYL CITRATE 50 UG/ML
INJECTION, SOLUTION INTRAMUSCULAR; INTRAVENOUS
Status: DISCONTINUED | OUTPATIENT
Start: 2024-10-18 | End: 2024-10-18 | Stop reason: SDUPTHER

## 2024-10-18 RX ORDER — WARFARIN SODIUM 7.5 MG/1
7.5 TABLET ORAL DAILY
Status: DISCONTINUED | OUTPATIENT
Start: 2024-10-18 | End: 2024-10-19 | Stop reason: HOSPADM

## 2024-10-18 RX ORDER — LOSARTAN POTASSIUM 50 MG/1
75 TABLET ORAL NIGHTLY
Status: DISCONTINUED | OUTPATIENT
Start: 2024-10-19 | End: 2024-10-19 | Stop reason: HOSPADM

## 2024-10-18 RX ORDER — MORPHINE SULFATE 2 MG/ML
2 INJECTION, SOLUTION INTRAMUSCULAR; INTRAVENOUS
Status: DISCONTINUED | OUTPATIENT
Start: 2024-10-18 | End: 2024-10-19

## 2024-10-18 RX ORDER — DEXAMETHASONE SODIUM PHOSPHATE 10 MG/ML
INJECTION INTRAMUSCULAR; INTRAVENOUS
Status: DISCONTINUED | OUTPATIENT
Start: 2024-10-18 | End: 2024-10-18 | Stop reason: SDUPTHER

## 2024-10-18 RX ORDER — BUPIVACAINE HYDROCHLORIDE 2.5 MG/ML
INJECTION, SOLUTION EPIDURAL; INFILTRATION; INTRACAUDAL PRN
Status: DISCONTINUED | OUTPATIENT
Start: 2024-10-18 | End: 2024-10-18 | Stop reason: ALTCHOICE

## 2024-10-18 RX ORDER — PROPOFOL 10 MG/ML
INJECTION, EMULSION INTRAVENOUS
Status: DISCONTINUED | OUTPATIENT
Start: 2024-10-18 | End: 2024-10-18 | Stop reason: SDUPTHER

## 2024-10-18 RX ADMIN — EPHEDRINE SULFATE 10 MG: 5 INJECTION INTRAVENOUS at 10:47

## 2024-10-18 RX ADMIN — HYDRALAZINE HYDROCHLORIDE 10 MG: 20 INJECTION INTRAMUSCULAR; INTRAVENOUS at 20:53

## 2024-10-18 RX ADMIN — WARFARIN SODIUM 7.5 MG: 7.5 TABLET ORAL at 17:43

## 2024-10-18 RX ADMIN — SODIUM CHLORIDE, PRESERVATIVE FREE 10 ML: 5 INJECTION INTRAVENOUS at 21:02

## 2024-10-18 RX ADMIN — FENTANYL CITRATE 50 MCG: 50 INJECTION, SOLUTION INTRAMUSCULAR; INTRAVENOUS at 10:20

## 2024-10-18 RX ADMIN — PROTAMINE SULFATE 30 MG: 10 INJECTION, SOLUTION INTRAVENOUS at 12:07

## 2024-10-18 RX ADMIN — OXYCODONE HYDROCHLORIDE 5 MG: 5 TABLET ORAL at 20:15

## 2024-10-18 RX ADMIN — SODIUM CHLORIDE: 9 INJECTION, SOLUTION INTRAVENOUS at 10:07

## 2024-10-18 RX ADMIN — OXYCODONE HYDROCHLORIDE 5 MG: 5 TABLET ORAL at 17:15

## 2024-10-18 RX ADMIN — PROPOFOL 200 MG: 10 INJECTION, EMULSION INTRAVENOUS at 10:17

## 2024-10-18 RX ADMIN — HEPARIN SODIUM 10000 UNITS: 1000 INJECTION INTRAVENOUS; SUBCUTANEOUS at 10:54

## 2024-10-18 RX ADMIN — Medication 100 MCG: at 10:33

## 2024-10-18 RX ADMIN — ROCURONIUM BROMIDE 50 MG: 10 INJECTION, SOLUTION INTRAVENOUS at 10:17

## 2024-10-18 RX ADMIN — INSULIN LISPRO 1 UNITS: 100 INJECTION, SOLUTION INTRAVENOUS; SUBCUTANEOUS at 18:22

## 2024-10-18 RX ADMIN — GLYCOPYRROLATE 0.2 MG: 0.2 INJECTION INTRAMUSCULAR; INTRAVENOUS at 10:50

## 2024-10-18 RX ADMIN — INSULIN LISPRO 4 UNITS: 100 INJECTION, SOLUTION INTRAVENOUS; SUBCUTANEOUS at 20:53

## 2024-10-18 RX ADMIN — SODIUM CHLORIDE: 9 INJECTION, SOLUTION INTRAVENOUS at 08:50

## 2024-10-18 RX ADMIN — DEXAMETHASONE SODIUM PHOSPHATE 10 MG: 10 INJECTION INTRAMUSCULAR; INTRAVENOUS at 10:27

## 2024-10-18 RX ADMIN — INSULIN LISPRO 2 UNITS: 100 INJECTION, SOLUTION INTRAVENOUS; SUBCUTANEOUS at 23:23

## 2024-10-18 RX ADMIN — LIDOCAINE HYDROCHLORIDE 100 MG: 20 INJECTION, SOLUTION INTRAVENOUS at 10:17

## 2024-10-18 RX ADMIN — REMIFENTANIL HYDROCHLORIDE 0.05 MCG/KG/MIN: 1 INJECTION, POWDER, LYOPHILIZED, FOR SOLUTION INTRAVENOUS at 10:25

## 2024-10-18 RX ADMIN — MIDAZOLAM 2 MG: 1 INJECTION INTRAMUSCULAR; INTRAVENOUS at 10:07

## 2024-10-18 RX ADMIN — CEFAZOLIN 2000 MG: 2 INJECTION, POWDER, FOR SOLUTION INTRAMUSCULAR; INTRAVENOUS at 10:28

## 2024-10-18 RX ADMIN — SODIUM CHLORIDE: 9 INJECTION, SOLUTION INTRAVENOUS at 13:19

## 2024-10-18 RX ADMIN — EPHEDRINE SULFATE 5 MG: 5 INJECTION INTRAVENOUS at 10:39

## 2024-10-18 RX ADMIN — EPHEDRINE SULFATE 10 MG: 5 INJECTION INTRAVENOUS at 10:57

## 2024-10-18 RX ADMIN — ONDANSETRON 4 MG: 2 INJECTION INTRAMUSCULAR; INTRAVENOUS at 12:12

## 2024-10-18 RX ADMIN — SUGAMMADEX 200 MG: 100 INJECTION, SOLUTION INTRAVENOUS at 12:33

## 2024-10-18 RX ADMIN — FENTANYL CITRATE 50 MCG: 50 INJECTION, SOLUTION INTRAMUSCULAR; INTRAVENOUS at 10:17

## 2024-10-18 RX ADMIN — PROPOFOL 100 MCG/KG/MIN: 10 INJECTION, EMULSION INTRAVENOUS at 10:25

## 2024-10-18 ASSESSMENT — PAIN - FUNCTIONAL ASSESSMENT
PAIN_FUNCTIONAL_ASSESSMENT: ACTIVITIES ARE NOT PREVENTED
PAIN_FUNCTIONAL_ASSESSMENT: 0-10
PAIN_FUNCTIONAL_ASSESSMENT: ACTIVITIES ARE NOT PREVENTED

## 2024-10-18 ASSESSMENT — PAIN DESCRIPTION - PAIN TYPE: TYPE: ACUTE PAIN;SURGICAL PAIN

## 2024-10-18 ASSESSMENT — PAIN SCALES - GENERAL
PAINLEVEL_OUTOF10: 6
PAINLEVEL_OUTOF10: 4
PAINLEVEL_OUTOF10: 6
PAINLEVEL_OUTOF10: 0
PAINLEVEL_OUTOF10: 3

## 2024-10-18 ASSESSMENT — PAIN DESCRIPTION - LOCATION
LOCATION: NECK;INCISION
LOCATION: BACK;NECK

## 2024-10-18 ASSESSMENT — PAIN DESCRIPTION - FREQUENCY: FREQUENCY: INTERMITTENT

## 2024-10-18 ASSESSMENT — PAIN DESCRIPTION - ONSET: ONSET: GRADUAL

## 2024-10-18 ASSESSMENT — PAIN DESCRIPTION - DESCRIPTORS
DESCRIPTORS: SORE
DESCRIPTORS: ACHING;SORE

## 2024-10-18 ASSESSMENT — PAIN DESCRIPTION - ORIENTATION
ORIENTATION: RIGHT
ORIENTATION: RIGHT

## 2024-10-18 NOTE — H&P
Vascular Surgery History & Physical Exam      Chief Complaint: MARIO ALBERTO    HISTORY OF PRESENT ILLNESS:                The patient is a 74 y.o. male who presents to the hospital for elective right carotid endarterectomy.  He denies any problems since the last office visit.    IMPRESSION:   Active Hospital Problems    Diagnosis     Stenosis of right carotid artery [I65.21]     Carotid stenosis, right [I65.21]        PLAN:  Right carotid endarterectomy     I reviewed the procedure with the patient.  I discussed the risks, benefits, and alternatives of the procedure.  The patient understands and consents.  All questions were answered.        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 lower extremity     Diabetes mellitus (HCC)     Diabetic foot ulcer (HCC)     Hyperlipidemia     Hypertension     Long term current use of anticoagulant     Paroxysmal atrial fibrillation (HCC)     documented on Holter monitor 2023    Presence of prosthetic heart valve     Staphylococcus aureus bacteremia     Wound infection         Past Surgical History:   Procedure Laterality Date    AORTIC VALVE REPLACEMENT      mechanical-2018    CARDIAC CATHETERIZATION  02/20/2018    Dr Levin    CARDIOVASCULAR STRESS TEST      March 2024    CATARACT EXTRACTION Right     CORONARY ARTERY BYPASS GRAFT      x3    HERNIA REPAIR      KNEE ARTHROPLASTY Left 03/29/2023    TOTAL KNEE ARTHROPLASTY Right        Current Medications:     Current Facility-Administered Medications:     0.9 % sodium chloride infusion, , IntraVENous, Continuous, Shubham Ibrahim APRN - CNP    sodium chloride flush 0.9 % injection 5-40 mL, 5-40 mL, IntraVENous, 2 times per day, Shubham Ibrahim APRN - CNP    sodium chloride flush 0.9 % injection 5-40 mL, 5-40 mL, IntraVENous, PRN,     K 4.0 10/10/2024    BUN 26 (H) 10/10/2024    CREATININE 1.0 10/10/2024       RADIOLOGY:

## 2024-10-18 NOTE — CARE COORDINATION
10/18 Care Coordination: Pt in [facility] Post  Rt CEA, CM spoke with pt in Room. PTA from home with spouse. Independent. Plan is Discharge home. Pt agreeable to [facility]. No Preference. Referral called to Mely at [facility]. Plan for discharge tomorrow.  CONSTANZA/MICHELLE will continue to follow for discharge planning.   Adrian GARCIA,RN--BC  [phone]

## 2024-10-18 NOTE — CARE COORDINATION
Case Management Assessment  Initial Evaluation    Date/Time of Evaluation: 10/18/2024 3:44 PM  Assessment Completed by: Adrian Bonds RN    If patient is discharged prior to next notation, then this note serves as note for discharge by case management.    Patient Name: Adrian Kunz                   YOB: 1950  Diagnosis: Carotid stenosis, right [I65.21]  Stenosis of right carotid artery [I65.21]                   Date / Time: 10/18/2024  7:56 AM    Patient Admission Status: Inpatient   Readmission Risk (Low < 19, Mod (19-27), High > 27): Readmission Risk Score: 6.9    Current PCP: Lorena Cedillo APRN - CNP  PCP verified by CM? Yes    Chart Reviewed: Yes      History Provided by: Patient  Patient Orientation: Alert and Oriented    Patient Cognition: Alert    Hospitalization in the last 30 days (Readmission):  No    If yes, Readmission Assessment in CM Navigator will be completed.    Advance Directives:      Code Status: Full Code   Patient's Primary Decision Maker is: Legal Next of Kin      Discharge Planning:    Patient lives with: Spouse/Significant Other Type of Home:    Primary Care Giver: Self  Patient Support Systems include: Spouse/Significant Other   Current Financial resources:    Current community resources:    Current services prior to admission:              Current DME:              Type of Home Care services:       ADLS  Prior functional level: Independent in ADLs/IADLs  Current functional level: Independent in ADLs/IADLs    PT AM-PAC:   /24  OT AM-PAC:   /24    Family can provide assistance at DC: Yes  Would you like Case Management to discuss the discharge plan with any other family members/significant others, and if so, who? No  Plans to Return to Present Housing: Yes  Other Identified Issues/Barriers to RETURNING to current housing:    Potential Assistance needed at discharge:              Potential DME:    Patient expects to discharge to:    Plan for transportation at  discharge:      Financial    Payor: [facility] MEDICARE / Plan: [employer] MEDIBLUE ESSENTIAL/PLUS / Product Type: *No Product type* /     Does insurance require precert for SNF: Yes    Potential assistance Purchasing Medications:    Meds-to-Beds request: Yes      [facility]  - [place], [place] - [address] [facility] [address] -  [phone] - F [phone]  [address] [facility] [address]  Phone: [phone] Fax: [phone]    [facility] - [place], [place] - [address] - P [phone] - F [phone]  [address]  [place] [address]  Phone: [phone] Fax: [phone]    [facility] - [place], [place] - [address] - P [phone] - F [phone]  [address]  [address]  [facility] [address]  Phone: [phone] Fax: [phone]      Notes:    Factors facilitating achievement of predicted outcomes: Family support    Barriers to discharge:       Additional Case Management Notes:       The Plan for Transition of Care is related to the following treatment goals of Carotid stenosis, right [I65.21]  Stenosis of right carotid artery [I65.21]    IF APPLICABLE: The Patient and/or patient representative Adrian and his family were provided with a choice of provider and agrees with the discharge plan. Freedom of choice list with basic dialogue that supports the patient's individualized plan of care/goals and shares the quality data associated with the providers was provided to:     Patient Representative Name:       The Patient and/or Patient Representative Agree with the Discharge Plan?      Adrian Bonds RN  Case Management Department

## 2024-10-18 NOTE — ANESTHESIA PRE PROCEDURE
Clinic  Patient taking differently: Take 7.5 mg by mouth nightly Take 1 tablet by mouth daily as directed by Anticoagulation Clinic 10/8/20   Migdalia Weaver MD   acetaminophen (TYLENOL) 650 MG extended release tablet Take 1 tablet by mouth every 8 hours as needed for Pain    ProviderKiran MD   aspirin 81 MG tablet Take 1 tablet by mouth daily    ProviderKiran MD   Multiple Vitamins-Minerals (THERAPEUTIC MULTIVITAMIN-MINERALS) tablet Take 1 tablet by mouth daily    ProviderKiran MD       Current medications:    Current Facility-Administered Medications   Medication Dose Route Frequency Provider Last Rate Last Admin    0.9 % sodium chloride infusion   IntraVENous Continuous Shubham Ibrahim APRN - CNP 50 mL/hr at 10/18/24 0850 New Bag at 10/18/24 0850    sodium chloride flush 0.9 % injection 5-40 mL  5-40 mL IntraVENous 2 times per day Shubham Ibrahim APRN - CNP        sodium chloride flush 0.9 % injection 5-40 mL  5-40 mL IntraVENous PRN Shubham Ibrahim APRN - CNP        0.9 % sodium chloride infusion   IntraVENous PRN Shubham Ibrahim APRN - CNP        ceFAZolin (ANCEF) 2,000 mg in sterile water 20 mL IV syringe  2,000 mg IntraVENous On Call to OR Shubham Ibrahim APRN - CNP           Allergies:    Allergies   Allergen Reactions    Iodine     Levaquin [Levofloxacin]     Lipitor [Atorvastatin]        Problem List:    Patient Active Problem List   Diagnosis Code    History of aortic valve replacement with metallic valve Z95.4    History of mechanical aortic valve replacement Z95.2    Osteoarthrosis, localized, primary, 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

## 2024-10-18 NOTE — ACP (ADVANCE CARE PLANNING)
Advance Care Planning   Healthcare Decision Maker:    Primary Decision Maker: Radha Kunz - [facility] - [phone]    Click here to complete Healthcare Decision Makers including selection of the Healthcare Decision Maker Relationship (ie \"Primary\").

## 2024-10-18 NOTE — ANESTHESIA PROCEDURE NOTES
Arterial Line:    An arterial line was placed using surface landmarks, in the procedure area for the following indication(s): continuous blood pressure monitoring and blood sampling needed.    A 20 gauge (size), 1 and 3/8 inch (length), Arrow (type) catheter was placed, Seldinger technique not used, into the left radial artery, secured by tape.  Anesthesia type: Local  Local infiltration: Injection    Events:  patient tolerated procedure well with no complications.10/18/2024 9:55 AM10/18/2024 10:00 AM  Anesthesiologist: Soco Davis MD  Resident/CRNA: Jacek Chopra APRN - CRNA  Other anesthesia staff: Danya Byrne RN  Performed: Other anesthesia staff   Preanesthetic Checklist  Completed: patient identified, IV checked, site marked, risks and benefits discussed, surgical/procedural consents, equipment checked, pre-op evaluation, timeout performed, anesthesia consent given, oxygen available, monitors applied/VS acknowledged, fire risk safety assessment completed and verbalized and blood product R/B/A discussed and consented

## 2024-10-18 NOTE — ANESTHESIA POSTPROCEDURE EVALUATION
Department of Anesthesiology  Postprocedure Note    Patient: Adrian Kunz  MRN: [id number]  YOB: 1950  Date of evaluation: 10/18/2024    Procedure Summary       Date: 10/18/24 Room / Location: [address]    Anesthesia Start: 1007 Anesthesia Stop:     Procedure: right carotid endarterectomy (Right) Diagnosis:       Carotid stenosis, right      (Carotid stenosis, right [I65.21])    Surgeons: Davide Moncada MD Responsible Provider: Soco Davis MD    Anesthesia Type: general ASA Status: 3            Anesthesia Type: No value filed.    Chris Phase I: Chris Score: 10    Chris Phase II:      Anesthesia Post Evaluation    Patient location during evaluation: PACU  Patient participation: complete - patient participated  Level of consciousness: awake  Airway patency: patent  Nausea & Vomiting: no nausea and no vomiting  Cardiovascular status: hemodynamically stable  Respiratory status: acceptable  Hydration status: euvolemic  Pain management: adequate    No notable events documented.

## 2024-10-18 NOTE — BRIEF OP NOTE
Brief Postoperative Note      Patient: Adrian Kunz  YOB: 1950  MRN: [id number]    Date of Procedure: 10/18/2024    Pre-Op Diagnosis Codes:      * Carotid stenosis, right [I65.21]    Post-Op Diagnosis: Same       Procedure(s):  right carotid endarterectomy    Surgeon(s):  Davide Moncada MD Chen, Winsor, MD    Assistant:  Surgical Assistant: Kirit Ardon  Resident: Rusty Clemens DO    Anesthesia: General    Estimated Blood Loss (mL): less than 100     Complications: None    Specimens:   ID Type Source Tests Collected by Time Destination   A : RIGHT CAROTID PLAQUE Specimen Neck SURGICAL PATHOLOGY Davdie Moncada MD 10/18/2024 1137        Implants:  Implant Name Type Inv. Item Serial No.  Lot No. LRB No. Used Action   GRAFT VASC W0.8XL8CM THK0.35-0.75MM CAR PERICARD PROC BOV -  Vascular grafts GRAFT VASC W0.8XL8CM THK0.35-0.75MM CAR PERICARD PROC BOV 0000 [employer] [id number] Right 1 Implanted         Drains: * No LDAs found *    Findings:  Infection Present At Time Of Surgery (PATOS) (choose all levels that have infection present):  No infection present  Other Findings:     Electronically signed by Davide Moncada MD on 10/18/2024 at 12:17 PM

## 2024-10-18 NOTE — PLAN OF CARE
Problem: Discharge Planning  Goal: Discharge to home or other facility with appropriate resources  Outcome: Progressing     Problem: Safety - Adult  Goal: Free from fall injury  Outcome: Progressing     Problem: Pain  Goal: Verbalizes/displays adequate comfort level or baseline comfort level  Outcome: Progressing     Problem: Neurosensory - Adult  Goal: Achieves stable or improved neurological status  Outcome: Progressing     Problem: Respiratory - Adult  Goal: Achieves optimal ventilation and oxygenation  Outcome: Progressing     Problem: Cardiovascular - Adult  Goal: Absence of cardiac dysrhythmias or at baseline  Outcome: Progressing     Problem: Skin/Tissue Integrity - Adult  Goal: Skin integrity remains intact  Outcome: Progressing  Goal: Incisions, wounds, or drain sites healing without S/S of infection  Outcome: Progressing     Problem: Gastrointestinal - Adult  Goal: Maintains or returns to baseline bowel function  Outcome: Progressing  Goal: Maintains adequate nutritional intake  Outcome: Progressing     Problem: Infection - Adult  Goal: Absence of infection at discharge  Outcome: Progressing     Problem: Metabolic/Fluid and Electrolytes - Adult  Goal: Electrolytes maintained within normal limits  Outcome: Progressing  Goal: Hemodynamic stability and optimal renal function maintained  Outcome: Progressing  Goal: Glucose maintained within prescribed range  Outcome: Progressing

## 2024-10-18 NOTE — FLOWSHEET NOTE
1300 Admitted to 3816 via bed from surgery.Drowsy,JUSTINE and follows commands.Vss.Neuro assessment intact.Oriented to room,call light within reach.Continue to monitor.

## 2024-10-18 NOTE — FLOWSHEET NOTE
1400 Awake and alert.Vss.Gonzales,neuro status intact.Denies pain or discomfort.Respirations nonlabored on room air.Sign off from anesthesia.Continue to monitor.

## 2024-10-18 NOTE — OP NOTE
Operative Note      Patient: Adrian Kunz  YOB: 1950  MRN: [id number]    Date of Procedure: 10/18/2024    Pre-Op Diagnosis Codes:      * Carotid stenosis, right [I65.21]    Post-Op Diagnosis: Same       Procedure(s):  right carotid endarterectomy    Surgeon(s):  Davide Moncada MD Chen, Winsor, MD    Assistant:   Surgical Assistant: Kirit Ardon  Resident: Rusty Clemens DO    Anesthesia: General    Estimated Blood Loss (mL): less than 100     Complications: None    Specimens:   ID Type Source Tests Collected by Time Destination   A : RIGHT CAROTID PLAQUE Specimen Neck SURGICAL PATHOLOGY Davide Moncada MD 10/18/2024 1137        Implants:  Implant Name Type Inv. Item Serial No.  Lot No. LRB No. Used Action   GRAFT VASC W0.8XL8CM THK0.35-0.75MM CAR PERICARD PROC BOV -  Vascular grafts GRAFT VASC W0.8XL8CM THK0.35-0.75MM CAR PERICARD PROC BOV 0000 [employer] [id number] Right 1 Implanted         Drains: * No LDAs found *    Findings:  Infection Present At Time Of Surgery (PATOS) (choose all levels that have infection present):  No infection present  Other Findings:     Detailed Description of Procedure:   The patient was identified and the procedure was confirmed. The right neck was prepped and draped in the usual sterile fashion. A skin incision was made along the anterior border of the sternocleidomastoid muscle and carried down through the subcutaneous tissue. Dissection continued through the platysma and along the anterior border of the sternocleidomastoid muscle. The common facial vein was divided between silk ties.     The common carotid artery was identified and dissected free from the surrounding tissues. It was surrounded proximally in the soft portion with an umbilical tape. The vagus nerve was deep to the artery and preserved. Dissection continued along the carotid artery and the external carotid artery and its superior thyroid branch were dissected  free from the surrounding tissues and surrounded with vessel loops for control. The patient was then heparinized, maintaining an activated clotting time greater than 300 seconds. Dissection continued along the internal carotid artery, which was freed from the surrounding tissues and surrounded with a vessel loop for control.  The hypoglossal nerve was identified and preserved.    The vessel loops on the external carotid artery branches were controlled and the internal carotid artery was clamped. The common carotid artery was clamped and then a longitudinal arteriotomy was made in the common carotid artery and extended through the bulb and onto the internal carotid artery. There was a 90% stenosis in the origin of the internal carotid artery.  A #8 [place] shunt was placed into the internal carotid artery and secured with a vessel loop. There was good back-bleeding from the shunt. The shunt was then placed into the common carotid artery and secured with a Rumel tourniquet. A standard endarterectomy was then performed of the obtaining smooth end points on the distal common and internal carotid arteries. Loose fibrinous debris was removed from the vessel bed, which was flushed with heparinized saline solution.  Multiple 7-0 Prolene tacking sutures were used to secure the distal intimal edge.  A patch was obtained and cut to the appropriate length and sewn to the artery using a running 6-0 Prolene suture. Prior to completing the closure, the shunt was clamped, cut, and removed from the common carotid artery which was flushed then clamped, and then from the internal carotid artery which was back bled then clamped.  The closure was completed and flow was restored initially through the external carotid artery followed by the internal carotid artery. Flow through the vessels was confirmed with the handheld Doppler probe.     The patient was given protamine and hemostasis was obtained. The incision was irrigated with saline

## 2024-10-18 NOTE — PROGRESS NOTES
4 Eyes Skin Assessment     NAME:  Adrian Kunz  YOB: 1950  MEDICAL RECORD NUMBER:  [id number]    The patient is being assessed for  Admission    I agree that at least one RN has performed a thorough Head to Toe Skin Assessment on the patient. ALL assessment sites listed below have been assessed.      Areas assessed by both nurses:    Head, Face, Ears, Shoulders, Back, Chest, Arms, Elbows, Hands, Sacrum. Buttock, Coccyx, Ischium, Legs. Feet and Heels, and Under Medical Devices         Does the Patient have a Wound? No noted wound(s)       Nico Prevention initiated by RN: No  Wound Care Orders initiated by RN: No    Pressure Injury (Stage 3,4, Unstageable, DTI, NWPT, and Complex wounds) if present, place Wound referral order by RN under : No    New Ostomies, if present place, Ostomy referral order under : No     Nurse 1 eSignature: Electronically signed by Clary Márquez RN on 10/18/24 at 2:31 PM EDT    **SHARE this note so that the co-signing nurse can place an eSignature**    Nurse 2 eSignature: {Esignature:[id number]}

## 2024-10-18 NOTE — PROGRESS NOTES
CVICU Admission Note    Name: Adrian Kunz  MRN: [id number]    CC: Postoperative Critical Care Management     Indication for Surgery/Procedure: Carotid artery stenosis     Important/Relevant PMH/PSH: HTN, AVR/CABG 2016, DMII, HLD, paroxsymal atrial fibrillation, knee replacement, MSSA bacteremia, right hallux infection 2017     Procedure/Surgeries: 10/18/2024 Right Carotid Endarterectomy     Physical Exam:    BP (!) 148/42   Pulse 61   Temp 97.9 °F (36.6 °C) (Temporal)   Resp 17   Ht 1.88 m (6' 2\")   Wt 104.3 kg (230 lb)   SpO2 97%   BMI 29.53 kg/m²     No results for input(s): \"WBC\", \"RBC\", \"HGB\", \"HCT\", \"MCV\", \"MCH\", \"MCHC\", \"RDW\", \"PLT\", \"MPV\" in the last 72 hours.  Recent Labs     10/18/24  1034   CREATININE 0.9       General Appearance: Arrived to ICU in stable condition   Eyes: PERRL  Pulmonary: No wheezes, no accessory muscle use noted   Cardiovascular: RRR, no heaves or thrills palpated   Telemetry: SR  Abdomen: Soft, nontender   Extremities: feet warm   Neurologic/Psych: Alert and oriented, following commands, equal in strength bilaterally, tongue midline   Incision: Neck incision soft well approximated       Assessment/Plan: Day of Surgery     1.  S/p Right CEA   - - Frequent neurovascular checks, monitor incision for signs of swelling/hematoma   - NPO, IVF @125cc/hr  - Bedrest  - prn pain control         Electronically signed by ALDO Marquez - CNP on 10/18/2024 at 1:30 PM

## 2024-10-19 VITALS
HEART RATE: 60 BPM | WEIGHT: 230 LBS | TEMPERATURE: 97.1 F | DIASTOLIC BLOOD PRESSURE: 70 MMHG | RESPIRATION RATE: 17 BRPM | SYSTOLIC BLOOD PRESSURE: 132 MMHG | HEIGHT: 74 IN | BODY MASS INDEX: 29.52 KG/M2 | OXYGEN SATURATION: 96 %

## 2024-10-19 LAB — GLUCOSE BLD-MCNC: 219 MG/DL (ref 74–99)

## 2024-10-19 PROCEDURE — 82962 GLUCOSE BLOOD TEST: CPT

## 2024-10-19 PROCEDURE — 6360000002 HC RX W HCPCS

## 2024-10-19 PROCEDURE — 6370000000 HC RX 637 (ALT 250 FOR IP): Performed by: SURGERY

## 2024-10-19 RX ORDER — OXYCODONE HYDROCHLORIDE 5 MG/1
5 TABLET ORAL
Qty: 10 TABLET | Refills: 0 | Status: SHIPPED | OUTPATIENT
Start: 2024-10-19 | End: 2024-10-22

## 2024-10-19 RX ADMIN — OXYCODONE HYDROCHLORIDE 5 MG: 5 TABLET ORAL at 04:48

## 2024-10-19 RX ADMIN — METFORMIN HYDROCHLORIDE 1000 MG: 1000 TABLET ORAL at 09:39

## 2024-10-19 RX ADMIN — ASPIRIN 81 MG 81 MG: 81 TABLET ORAL at 09:39

## 2024-10-19 ASSESSMENT — PAIN DESCRIPTION - ONSET
ONSET: ON-GOING
ONSET: ON-GOING

## 2024-10-19 ASSESSMENT — PAIN DESCRIPTION - PAIN TYPE
TYPE: ACUTE PAIN;SURGICAL PAIN
TYPE: ACUTE PAIN;SURGICAL PAIN

## 2024-10-19 ASSESSMENT — PAIN - FUNCTIONAL ASSESSMENT
PAIN_FUNCTIONAL_ASSESSMENT: ACTIVITIES ARE NOT PREVENTED
PAIN_FUNCTIONAL_ASSESSMENT: ACTIVITIES ARE NOT PREVENTED

## 2024-10-19 ASSESSMENT — PAIN SCALES - GENERAL
PAINLEVEL_OUTOF10: 3
PAINLEVEL_OUTOF10: 3
PAINLEVEL_OUTOF10: 0
PAINLEVEL_OUTOF10: 0
PAINLEVEL_OUTOF10: 1
PAINLEVEL_OUTOF10: 2

## 2024-10-19 ASSESSMENT — PAIN DESCRIPTION - DESCRIPTORS
DESCRIPTORS: ACHING;SORE
DESCRIPTORS: ACHING

## 2024-10-19 ASSESSMENT — PAIN DESCRIPTION - ORIENTATION
ORIENTATION: RIGHT
ORIENTATION: RIGHT

## 2024-10-19 ASSESSMENT — PAIN DESCRIPTION - FREQUENCY
FREQUENCY: INTERMITTENT
FREQUENCY: INTERMITTENT

## 2024-10-19 ASSESSMENT — PAIN DESCRIPTION - LOCATION
LOCATION: NECK;INCISION
LOCATION: NECK;INCISION

## 2024-10-19 NOTE — CARE COORDINATION
10/19/24 Discharge order noted Pt is independent from home Plan is home with [facility] Mely from [facility] notified of discharge Family to transport Electronically signed by Joe Torres RN on 10/19/2024 at 8:28 AM

## 2024-10-19 NOTE — DISCHARGE SUMMARY
Physician Discharge Summary     Patient ID:  Adrian Kunz  [id number]  74 y.o.  1950    Admit date: 10/18/2024    Discharge date and time: 10/19/2024    Admitting Physician: Davide Moncada MD     Admission Diagnoses: Carotid stenosis, right [I65.21]  Stenosis of right carotid artery [I65.21]    Discharge Diagnoses: Principal Problem:    Carotid stenosis, right  Active Problems:    Stenosis of right carotid artery  Resolved Problems:    * No resolved hospital problems. *      Admission Condition: good    Discharged Condition: stable    Indication for Admission: Neurological and hemodynamic monitoring    Hospital Course/Procedures/Operation/treatments:   Patient presented to the hospital 10/18 with a history of right carotid artery stenosis.  He underwent elective right carotid endarterectomy on 10/18 as expected.  Overnight the patient did well without any acute events and his blood pressure was well-managed.  He was tolerating regular diet and the following morning was appropriate for discharge home.        Consults:   None    Significant Diagnostic Studies:   No results found.    Discharge Exam:  GENERAL: Resting comfortably, conversant alert and oriented  NEURO: Cranial nerves II through XII are intact  SKIN: Right neck incision with mild to moderate ecchymosis without palpable mass, incision appears clean and dry  LUNGS: Nonlabored breathing on room air, symmetric chest rise.  CARDIOVASCULAR: Regular rate and normotensive, warm  ABDOMEN:  Soft, non-distended, non-tender. No guarding, rigidity, rebound.  EXTREMITIES: Able to move all 4 limbs, bilateral upper extremity and lower extremity 5 out of 5 motor.  Palpable pulses.    Disposition: home    In process/preliminary results:  Outstanding Order Results       No orders found for last 30 day(s).            Patient Instructions:   Current Discharge Medication List             Details   oxyCODONE (ROXICODONE) 5 MG immediate release tablet Take 1 tablet by  You develop any abnormal bruising or bleeding.  Ø You develop a rash.  Ø You have difficulty breathing.  Ø You develop any reaction or side effects to medicine given.  Ø You develop chest pain, shortness of breath, or pain or swelling in your legs.  Ø You have a return of symptoms or problems that caused you to have this surgery.  Ø You develop a temporary loss of vision.  Ø You develop temporary numbness on one side.  Ø You develop a temporary inability to speak (aphasia).  Ø You develop temporary areas of weakness.  Ø You have problems or concerns that have not been answered.     MAKE SURE YOU:  Ø Understand these instructions.  Ø Will watch your condition.  Ø Will get help right away if you are not doing well or get worse.     Document Released: 01/01/2000  Document Re-Released: 03/14/2011  [employer]® Patient Information ©2011 [employer].    Other Instructions:    No driving for 2 weeks.  OK to shower.  Wash incision daily with soap and water.    FOLLOW-UP CARE:  [x]Call the office at [phone] for follow-up appointment in 2 weeks.    Follow up:   Davide Moncada MD  [address] [facility].  [facility] [address]  [phone]    Call in 2 week(s)         Signed:  Pillo Palmer DO  10/19/2024  8:09 AM

## 2024-10-19 NOTE — PROGRESS NOTES
Message sent to Dr. Palmer regarding patient elevated blood sugar and order for 2 units of insulin. Patient requesting different dosage based on him not receiving his home dose of Toujeo.

## 2024-10-19 NOTE — PROGRESS NOTES
VASCULAR SURGERY PROGRESS NOTE    Pt is being seen in f/u today regarding right carotid artery stenosis    SUBJECTIVE  Patient doing overall well postoperative day 1 for elective right carotid endarterectomy.  Against hypoglycemia episodes overnight that was corrected with insulin.  Otherwise no acute events.    CURRENT MEDICATIONS    sodium chloride        sodium chloride flush, sodium chloride, oxyCODONE, morphine **OR** morphine, hydrALAZINE    aspirin  81 mg Oral Daily    losartan  75 mg Oral Nightly    metFORMIN  1,000 mg Oral BID WC    metoprolol succinate  25 mg Oral Nightly    rosuvastatin  40 mg Oral QPM    warfarin  7.5 mg Oral Daily    sodium chloride flush  5-40 mL IntraVENous 2 times per day    insulin lispro  0-4 Units SubCUTAneous 4x Daily AC & HS        PHYSICAL EXAM   BP (!) 127/57   Pulse 66   Temp 97 °F (36.1 °C) (Temporal)   Resp 14   Ht 1.88 m (6' 2\")   Wt 104.3 kg (230 lb)   SpO2 98%   BMI 29.53 kg/m²     Intake/Output Summary (Last 24 hours) at 10/19/2024 0747  Last data filed at 10/19/2024 0600  Gross per 24 hour   Intake 3574.99 ml   Output 2800 ml   Net 774.99 ml          GENERAL: Resting comfortably, conversant alert and oriented  NEURO: Cranial nerves II through XII are intact  SKIN: Right neck incision with mild to moderate ecchymosis without palpable mass, incision appears clean and dry  LUNGS: Nonlabored breathing on room air, symmetric chest rise.  CARDIOVASCULAR: Regular rate and normotensive, warm  ABDOMEN:  Soft, non-distended, non-tender. No guarding, rigidity, rebound.  EXTREMITIES: Able to move all 4 limbs, bilateral upper extremity and lower extremity 5 out of 5 motor.  Palpable pulses.    LABS    Lab Results   Component Value Date    WBC 6.2 10/10/2024    HGB 13.9 10/10/2024    HCT 42.2 10/10/2024     10/10/2024    PROTIME 16.6 (H) 10/18/2024    INR 1.5 10/18/2024    APTT 36.7 (H) 10/10/2024    K 4.0 10/10/2024    BUN 26 (H) 10/10/2024    CREATININE 0.9  10/18/2024       ASSESSMENT/PLAN  74 y.o. male with right carotid artery stenosis status post elective right carotid endarterectomy 10/18    -Discontinue Cardene, use as needed antihypertensives  -Advance to regular diabetic diet today  -Has had adequate urine output, monitor I's and O's  -Will remove A-line  -Up and out of bed  -Home medications  -anticipate DC home this afternoon    Pillo Palmer DO  Surgery Resident PGY-3  10/19/2024  7:47 AM     Agree.  Patient doing well and wants to go home.  Instructions reviewed.  F/u 2 weeks.    Discharge: home  Condition on discharge: good

## 2024-10-19 NOTE — DISCHARGE INSTRUCTIONS
Carotid Stenosis and Carotid Endarterectomy  Care After Surgery     Refer to this sheet in the next few weeks. These discharge instructions provide you with general information on caring for yourself after you leave the hospital. Your caregiver may also give you specific instructions. Your treatment has been planned according to the most current medical practices available, but unavoidable complications sometimes occur. If you have any problems or questions after discharge, please call your caregiver.     HOME CARE INSTRUCTIONS  Ø It is normal to be sore for a couple weeks after surgery. See your caregiver if this seems to be getting worse rather than better.  Ø Take showers, not baths, for a few days after surgery or until instructed otherwise by your caregiver. Do not take baths or swim until directed by your surgeon.  Ø Only take over-the-counter or prescription medicines for pain, discomfort, or fever as directed by your caregiver.  Ø A blood thinner (anticoagulant) may be prescribed after surgery. This medicine should be taken exactly as directed.  Ø Change bandages (dressings) as directed by your caregiver.  Ø Resume your normal activities as directed by your caregiver.  Ø Avoid lifting until you are instructed otherwise.  Ø Make an appointment to see your caregiver for stitches (suture) or staple removal when instructed.  Ø Stop smoking if you smoke. This is a grave risk factor.  Ø Stop taking the pill (oral contraceptives) unless your caregiver recommends otherwise.  Ø Maintain good blood pressure control.  Ø Exercise regularly or as instructed.  Ø Lower blood lipids (cholesterol and triglycerides).  Ø Eat a heart-healthy diet. Manage heart problems if they are contributing to your risk.     SEEK MEDICAL CARE IF:  Ø There is increased bleeding from the wound.  Ø You notice redness, swelling, or increasing pain in the wound.  Ø You notice swelling in your neck or have difficulty breathing or talking.  Ø You  notice a bad smell or pus coming from the wound or dressing.  Ø You have an oral temperature above 102º F (38.9º C).     SEEK IMMEDIATE MEDICAL CARE IF:  Ø Your initial symptoms are getting worse rather than better.  Ø You develop any abnormal bruising or bleeding.  Ø You develop a rash.  Ø You have difficulty breathing.  Ø You develop any reaction or side effects to medicine given.  Ø You develop chest pain, shortness of breath, or pain or swelling in your legs.  Ø You have a return of symptoms or problems that caused you to have this surgery.  Ø You develop a temporary loss of vision.  Ø You develop temporary numbness on one side.  Ø You develop a temporary inability to speak (aphasia).  Ø You develop temporary areas of weakness.  Ø You have problems or concerns that have not been answered.     MAKE SURE YOU:  Ø Understand these instructions.  Ø Will watch your condition.  Ø Will get help right away if you are not doing well or get worse.     Document Released: 01/01/2000  Document Re-Released: 03/14/2011  [employer]® Patient Information ©2011 [employer].    Other Instructions:    No driving for 2 weeks.  OK to shower.  Wash incision daily with soap and water.    FOLLOW-UP CARE:  [x]Call the office at [phone] for follow-up appointment in 2 weeks.    Pillo Palmer DO  10/19/2024  7:49 AM

## 2024-10-19 NOTE — PLAN OF CARE
Problem: Safety - Adult  Goal: Free from fall injury  Outcome: Progressing     Problem: Cardiovascular - Adult  Goal: Absence of cardiac dysrhythmias or at baseline  Outcome: Progressing    Problem: Skin/Tissue Integrity - Adult  Goal: Skin integrity remains intact  Outcome: Progressing  Goal: Incisions, wounds, or drain sites healing without S/S of infection  Outcome: Progressing

## 2024-10-19 NOTE — PROGRESS NOTES
IV's removed, patient assisted with dressing, education provided with discharge paperwork. All questions answered at this time.

## 2024-10-19 NOTE — PLAN OF CARE
Patient was seen regarding hyperglycemia.  Overall he is doing well.  Will adjust patient's insulin sliding scale to medium dose.  Additionally he is hypertensive, will order hydralazine as needed.    Dr. Pillo Palmer PGY3

## 2024-10-19 NOTE — PLAN OF CARE
Problem: Discharge Planning  Goal: Discharge to home or other facility with appropriate resources  10/19/2024 1129 by Zahira Liu RN  Outcome: Completed     Problem: Chronic Conditions and Co-morbidities  Goal: Patient's chronic conditions and co-morbidity symptoms are monitored and maintained or improved  10/19/2024 1129 by Zahira Liu RN  Outcome: Completed     Problem: Safety - Adult  Goal: Free from fall injury  10/19/2024 1129 by Zahira Liu RN  Outcome: Completed     Problem: Pain  Goal: Verbalizes/displays adequate comfort level or baseline comfort level  Outcome: Completed     Problem: Neurosensory - Adult  Goal: Achieves stable or improved neurological status  Outcome: Completed     Problem: Respiratory - Adult  Goal: Achieves optimal ventilation and oxygenation  Outcome: Completed     Problem: Cardiovascular - Adult  Goal: Absence of cardiac dysrhythmias or at baseline  10/19/2024 1129 by Zahira Liu RN  Outcome: Completed     Problem: Skin/Tissue Integrity - Adult  Goal: Skin integrity remains intact  10/19/2024 1129 by Zahira Liu RN  Outcome: Completed     Problem: Skin/Tissue Integrity - Adult  Goal: Incisions, wounds, or drain sites healing without S/S of infection  10/19/2024 1129 by Zahira Liu RN  Outcome: Completed     Problem: Gastrointestinal - Adult  Goal: Maintains or returns to baseline bowel function  Outcome: Completed  Goal: Maintains adequate nutritional intake  Outcome: Completed     Problem: Infection - Adult  Goal: Absence of infection at discharge  Outcome: Completed     Problem: Metabolic/Fluid and Electrolytes - Adult  Goal: Electrolytes maintained within normal limits  Outcome: Completed  Goal: Hemodynamic stability and optimal renal function maintained  Outcome: Completed  Goal: Glucose maintained within prescribed range  Outcome: Completed     Problem: Hematologic - Adult  Goal: Maintains hematologic stability  Outcome: Completed

## 2024-10-21 LAB
ACTIVATED CLOTTING TIME, LOW RANGE: 174 SEC
ACTIVATED CLOTTING TIME, LOW RANGE: 180 SEC

## 2024-10-22 ENCOUNTER — TELEPHONE (OUTPATIENT)
Dept: VASCULAR SURGERY | Age: 74
End: 2024-10-22

## 2024-10-23 LAB — SURGICAL PATHOLOGY REPORT: NORMAL

## 2024-11-01 NOTE — PROGRESS NOTES
Physician Progress Note      PATIENT:               CHAPO CRYSTAL  [facility] #:                  [id number]  :                       1950  ADMIT DATE:       10/18/2024 7:56 AM  DISCH DATE:        10/19/2024 11:20 AM  RESPONDING  PROVIDER #:        Davide Moncada MD          QUERY TEXT:    Patient with atrial fibrillation and is maintained on Coumadin. If possible,   please document in progress notes and discharge summary if you are evaluating   and/or treating any of the following:?  ?  The medical record reflects the following:  Risk Factors: 74 yr old male with HTN  Clinical Indicators: PAF  Treatment: Coumadin  Options provided:  -- Secondary hypercoagulable state in a patient with atrial fibrillation  -- Other - I will add my own diagnosis  -- Disagree - Not applicable / Not valid  -- Disagree - Clinically unable to determine / Unknown  -- Refer to Clinical Documentation Reviewer    PROVIDER RESPONSE TEXT:    This patient has secondary hypercoagulable state in a patient with atrial   fibrillation.    Query created by: Gris Mendoza on 10/25/2024 1:22 PM      Electronically signed by:  Davide Moncada MD 2024 8:07 AM

## 2024-11-05 ENCOUNTER — OFFICE VISIT (OUTPATIENT)
Dept: VASCULAR SURGERY | Age: 74
End: 2024-11-05

## 2024-11-05 DIAGNOSIS — I65.21 CAROTID STENOSIS, ASYMPTOMATIC, RIGHT: Primary | ICD-10-CM

## 2024-11-05 PROCEDURE — 99024 POSTOP FOLLOW-UP VISIT: CPT | Performed by: SURGERY

## 2024-11-05 NOTE — PROGRESS NOTES
11/5/2024    Adrian Kunz  1950    Chief Complaint   Patient presents with    Post-Op Check     S/P right CEA.       Patient returns for post operative evaluation status post right carotid endarterectomy.  The patient denies any unexpected problems since hospital discharge and overall feels he is doing very well. Denies stroke like symptoms.        Procedure Laterality Date    AORTIC VALVE REPLACEMENT      mechanical-2018    CARDIAC CATHETERIZATION  02/20/2018    Dr Levin    CARDIOVASCULAR STRESS TEST      March 2024    CAROTID ENDARTERECTOMY Right 10/18/2024    right carotid endarterectomy performed by Davide Moncada MD at [facility] OR    CATARACT EXTRACTION Right     CORONARY ARTERY BYPASS GRAFT      x3    HERNIA REPAIR      KNEE ARTHROPLASTY Left 03/29/2023    TOTAL KNEE ARTHROPLASTY Right        Physical Exam:  The neck incision is healing without evidence of infection.  Heart rhythm is regular.  Radial pulse are appreciated bilaterally. Equal strength upper and lower extremities    Assessment:  Post-operative carotid endarterectomy.    Problem List Items Addressed This Visit          Circulatory    Carotid stenosis, asymptomatic, right - Primary    Relevant Orders    Vascular duplex carotid bilateral       I reviewed with the patient that normal activities can be resumed as tolerated.    Plan: Return in 6 months for follow-up carotid ultrasound.    Pt seen and plan reviewed with Dr. Moncada.     María Elena Powell PA-C

## 2025-06-24 ENCOUNTER — OFFICE VISIT (OUTPATIENT)
Dept: VASCULAR SURGERY | Age: 75
End: 2025-06-24
Payer: MEDICARE

## 2025-06-24 ENCOUNTER — HOSPITAL ENCOUNTER (OUTPATIENT)
Dept: CARDIOLOGY | Age: 75
Discharge: HOME | End: 2025-06-26
Payer: MEDICARE

## 2025-06-24 DIAGNOSIS — I65.21 CAROTID STENOSIS, ASYMPTOMATIC, RIGHT: Primary | ICD-10-CM

## 2025-06-24 DIAGNOSIS — I65.21 CAROTID STENOSIS, ASYMPTOMATIC, RIGHT: ICD-10-CM

## 2025-06-24 LAB
VAS LEFT CCA DIST EDV: 15.8 CM/S
VAS LEFT CCA DIST PSV: 96.7 CM/S
VAS LEFT CCA PROX EDV: 18 CM/S
VAS LEFT CCA PROX PSV: 125 CM/S
VAS LEFT ECA EDV: 5.3 CM/S
VAS LEFT ECA PSV: 87.4 CM/S
VAS LEFT ICA DIST EDV: 18.6 CM/S
VAS LEFT ICA DIST PSV: 75.7 CM/S
VAS LEFT ICA MID EDV: 12.1 CM/S
VAS LEFT ICA MID PSV: 80.7 CM/S
VAS LEFT ICA PROX EDV: 10.8 CM/S
VAS LEFT ICA PROX PSV: 72.6 CM/S
VAS LEFT ICA/CCA PSV: 0.6 NO UNITS
VAS LEFT VERTEBRAL EDV: 12 CM/S
VAS LEFT VERTEBRAL PSV: 45 CM/S
VAS RIGHT CCA DIST EDV: 18.1 CM/S
VAS RIGHT CCA DIST PSV: 103.9 CM/S
VAS RIGHT CCA PROX EDV: 12.6 CM/S
VAS RIGHT CCA PROX PSV: 103.9 CM/S
VAS RIGHT ECA EDV: 0 CM/S
VAS RIGHT ECA PSV: 180 CM/S
VAS RIGHT ICA DIST EDV: 18.9 CM/S
VAS RIGHT ICA DIST PSV: 153.8 CM/S
VAS RIGHT ICA MID EDV: 74.6 CM/S
VAS RIGHT ICA MID PSV: 321.5 CM/S
VAS RIGHT ICA PROX EDV: 89.1 CM/S
VAS RIGHT ICA PROX PSV: 336 CM/S
VAS RIGHT ICA/CCA PSV: 3.2 NO UNITS
VAS RIGHT VERTEBRAL EDV: 12 CM/S
VAS RIGHT VERTEBRAL PSV: 41.7 CM/S

## 2025-06-24 PROCEDURE — 1159F MED LIST DOCD IN RCRD: CPT | Performed by: SURGERY

## 2025-06-24 PROCEDURE — 1123F ACP DISCUSS/DSCN MKR DOCD: CPT | Performed by: SURGERY

## 2025-06-24 PROCEDURE — 93880 EXTRACRANIAL BILAT STUDY: CPT | Performed by: SURGERY

## 2025-06-24 PROCEDURE — 99213 OFFICE O/P EST LOW 20 MIN: CPT | Performed by: SURGERY

## 2025-06-24 PROCEDURE — 93880 EXTRACRANIAL BILAT STUDY: CPT

## 2025-06-24 NOTE — PROGRESS NOTES
Vascular Surgery Outpatient Progress Note      Chief Complaint   Patient presents with    Follow-up     carloz       HISTORY OF PRESENT ILLNESS:                The patient is a 74 y.o. male who returns for follow-up evaluation of carotid artery disease and right carotid endarterectomy.  The patient is accompanied by his wife.  He remains on warfarin for valvular heart disease and atrial fibrillation.  He denies any symptoms of stroke or mini stroke.  He denies any new problems since I saw him 6 months ago.    Past Medical History:        Diagnosis Date    Anemia     Aortic valve stenosis with insufficiency     Atherosclerotic heart disease native coronary artery w/angina pectoris     CAD (coronary artery disease)     Cardiac murmur     Cellulitis of left lower extremity     Diabetes mellitus (HCC)     Diabetic foot ulcer (HCC)     Hyperlipidemia     Hypertension     Long term current use of anticoagulant     Paroxysmal atrial fibrillation (HCC)     documented on Holter monitor 2023    Presence of prosthetic heart valve     Staphylococcus aureus bacteremia     Wound infection      Past Surgical History:        Procedure Laterality Date    AORTIC VALVE REPLACEMENT      mechanical-2018    CARDIAC CATHETERIZATION  02/20/2018    Dr Levin    CARDIOVASCULAR STRESS TEST      March 2024    CAROTID ENDARTERECTOMY Right 10/18/2024    right carotid endarterectomy performed by Davide Moncada MD at [facility] [place]    CATARACT EXTRACTION Right     CORONARY ARTERY BYPASS GRAFT      x3    HERNIA REPAIR      KNEE ARTHROPLASTY Left 03/29/2023    TOTAL KNEE ARTHROPLASTY Right      Current Medications:   Prior to Admission medications    Medication Sig Start Date End Date Taking? Authorizing Provider   Insulin Glargine, 2 Unit Dial, (TOUJEO MAX SOLOSTAR) 300 UNIT/ML SOPN Inject 60 Units into the skin nightly   Yes ProviderKiran MD   rosuvastatin (CRESTOR) 40 MG tablet Take 1 tablet by mouth every evening   Yes ProviderKiran

## (undated) DEVICE — SYRINGE 20ML LL S/C 50

## (undated) DEVICE — NEEDLE HYPO 27GA L0.5IN GRY POLYPR HUB S STL REG BVL STR

## (undated) DEVICE — NEEDLE HYPO 21GA L1.5IN GRN POLYPR HUB S STL REG BVL STR

## (undated) DEVICE — ELECTRODE PT RET AD L9FT HI MOIST COND ADH HYDRGEL CORDED

## (undated) DEVICE — MINOR VASCULAR: Brand: MEDLINE INDUSTRIES, INC.

## (undated) DEVICE — SYRINGE MED 10ML LUERLOCK TIP W/O SFTY DISP

## (undated) DEVICE — SYRINGE MEDICAL 3ML CLEAR PLASTIC STANDARD NON CONTROL LUERLOCK TIP DISPOSABLE

## (undated) DEVICE — SYRINGE MED 5ML STD CLR PLAS LUERLOCK TIP N CTRL DISP

## (undated) DEVICE — CATHETER,URETHRAL,REDRUBBER,STERILE,22FR: Brand: MEDLINE

## (undated) DEVICE — SOLUTION IV 500ML 0.9% SOD CHL PH 5 INJ USP VIAFLX PLAS

## (undated) DEVICE — CATHETER IV 14GA L1.75IN OD2.146MM ID1.740MM ORNG VIALON

## (undated) DEVICE — STRAP POS MP 30X3 IN HK LOOP CLOSURE FOAM DISP

## (undated) DEVICE — LOOP VES W25MM THK1MM MAXI RED SIL FLD REPELLENT 100 PER

## (undated) DEVICE — 3M™ IOBAN™ 2 ANTIMICROBIAL INCISE DRAPE 6640EZ: Brand: IOBAN™ 2